# Patient Record
Sex: MALE | Race: WHITE | NOT HISPANIC OR LATINO | Employment: OTHER | URBAN - METROPOLITAN AREA
[De-identification: names, ages, dates, MRNs, and addresses within clinical notes are randomized per-mention and may not be internally consistent; named-entity substitution may affect disease eponyms.]

---

## 2023-08-28 PROBLEM — D23.10 PAPILLOMA OF EYELID: Status: ACTIVE | Noted: 2023-08-28

## 2023-08-28 PROBLEM — H61.23 BILATERAL IMPACTED CERUMEN: Status: ACTIVE | Noted: 2020-03-12

## 2023-08-28 PROBLEM — K21.9 GERD (GASTROESOPHAGEAL REFLUX DISEASE): Status: ACTIVE | Noted: 2023-08-28

## 2023-08-28 PROBLEM — R42 VERTIGO: Status: ACTIVE | Noted: 2023-08-28

## 2023-08-28 PROBLEM — H69.93 DYSFUNCTION OF BOTH EUSTACHIAN TUBES: Status: ACTIVE | Noted: 2020-04-14

## 2023-08-28 PROBLEM — M79.9 DISORDER OF MUSCULOSKELETAL SYSTEM: Status: ACTIVE | Noted: 2023-08-28

## 2023-08-28 PROBLEM — H11.159 PINGUECULA: Status: ACTIVE | Noted: 2023-08-28

## 2023-08-28 PROBLEM — L98.499: Status: ACTIVE | Noted: 2023-08-28

## 2023-08-28 PROBLEM — H26.9 CATARACTS, BOTH EYES: Status: ACTIVE | Noted: 2023-08-28

## 2023-08-28 PROBLEM — R10.13 EPIGASTRIC PAIN: Status: ACTIVE | Noted: 2023-08-28

## 2023-08-28 PROBLEM — E55.9 VITAMIN D DEFICIENCY: Status: ACTIVE | Noted: 2023-08-28

## 2023-08-28 PROBLEM — R00.1 SINUS BRADYCARDIA: Status: ACTIVE | Noted: 2023-08-28

## 2023-08-28 PROBLEM — R73.9 HYPERGLYCEMIA: Status: ACTIVE | Noted: 2023-08-28

## 2023-08-28 PROBLEM — R26.9 ABNORMAL GAIT: Status: ACTIVE | Noted: 2023-08-28

## 2023-08-28 PROBLEM — I49.9 IRREGULAR HEART RHYTHM: Status: ACTIVE | Noted: 2023-08-28

## 2023-08-28 PROBLEM — H02.9 LESION OF LEFT UPPER EYELID: Status: ACTIVE | Noted: 2023-08-28

## 2023-08-28 PROBLEM — J45.991 COUGH VARIANT ASTHMA (HHS-HCC): Status: ACTIVE | Noted: 2023-08-28

## 2023-08-28 PROBLEM — M48.061 LUMBAR SPINAL STENOSIS: Status: ACTIVE | Noted: 2023-08-28

## 2023-08-28 PROBLEM — M54.12 CERVICAL RADICULOPATHY: Status: ACTIVE | Noted: 2023-08-28

## 2023-08-28 PROBLEM — I10 HYPERTENSION: Status: ACTIVE | Noted: 2023-08-28

## 2023-08-28 PROBLEM — K56.609 INTESTINAL OBSTRUCTION (MULTI): Status: ACTIVE | Noted: 2023-08-28

## 2023-08-28 PROBLEM — N52.31 ERECTILE DYSFUNCTION AFTER RADICAL PROSTATECTOMY: Status: ACTIVE | Noted: 2023-08-28

## 2023-08-28 PROBLEM — H81.10 BENIGN PAROXYSMAL POSITIONAL VERTIGO: Status: ACTIVE | Noted: 2020-03-12

## 2023-08-28 PROBLEM — H18.419 ARCUS SENILIS: Status: ACTIVE | Noted: 2023-08-28

## 2023-08-28 PROBLEM — J31.0 CHRONIC RHINITIS: Status: ACTIVE | Noted: 2023-08-28

## 2023-08-28 PROBLEM — R79.89 LOW VITAMIN D LEVEL: Status: ACTIVE | Noted: 2023-08-28

## 2023-08-28 PROBLEM — C61 PROSTATE CANCER (MULTI): Status: ACTIVE | Noted: 2023-08-28

## 2023-08-28 PROBLEM — E66.01 MORBID OBESITY (MULTI): Status: ACTIVE | Noted: 2023-08-28

## 2023-08-28 PROBLEM — I49.3 PVC (PREMATURE VENTRICULAR CONTRACTION): Status: ACTIVE | Noted: 2023-08-28

## 2023-08-28 RX ORDER — OMEPRAZOLE 40 MG/1
40 CAPSULE, DELAYED RELEASE ORAL DAILY PRN
COMMUNITY

## 2023-08-28 RX ORDER — PREDNISONE 5 MG/1
5 TABLET ORAL 2 TIMES DAILY
COMMUNITY

## 2023-08-28 RX ORDER — IPRATROPIUM BROMIDE 42 UG/1
2 SPRAY, METERED NASAL 3 TIMES DAILY
COMMUNITY
Start: 2021-04-02 | End: 2023-11-01 | Stop reason: WASHOUT

## 2023-08-28 RX ORDER — TRAMADOL HYDROCHLORIDE 50 MG/1
50 TABLET ORAL DAILY PRN
COMMUNITY

## 2023-08-28 RX ORDER — POLYETHYLENE GLYCOL 3350 17 G/17G
17 POWDER, FOR SOLUTION ORAL DAILY
COMMUNITY

## 2023-08-28 RX ORDER — FLUTICASONE PROPIONATE 50 MCG
2 SPRAY, SUSPENSION (ML) NASAL AS NEEDED
COMMUNITY

## 2023-08-28 RX ORDER — LOSARTAN POTASSIUM 25 MG/1
25 TABLET ORAL DAILY
COMMUNITY
Start: 2021-04-07 | End: 2023-11-01 | Stop reason: WASHOUT

## 2023-08-28 RX ORDER — LISINOPRIL 30 MG/1
30 TABLET ORAL
COMMUNITY
End: 2023-11-01 | Stop reason: WASHOUT

## 2023-08-28 RX ORDER — DOCUSATE SODIUM 100 MG/1
1 CAPSULE, LIQUID FILLED ORAL DAILY PRN
COMMUNITY

## 2023-10-31 PROBLEM — M21.372 LEFT FOOT DROP: Status: ACTIVE | Noted: 2023-10-31

## 2023-11-01 ENCOUNTER — OFFICE VISIT (OUTPATIENT)
Dept: PRIMARY CARE | Facility: CLINIC | Age: 83
End: 2023-11-01
Payer: MEDICARE

## 2023-11-01 VITALS
WEIGHT: 186 LBS | DIASTOLIC BLOOD PRESSURE: 86 MMHG | HEART RATE: 68 BPM | HEIGHT: 68 IN | TEMPERATURE: 96.5 F | BODY MASS INDEX: 28.19 KG/M2 | OXYGEN SATURATION: 97 % | SYSTOLIC BLOOD PRESSURE: 138 MMHG

## 2023-11-01 DIAGNOSIS — C61 PROSTATE CANCER (MULTI): ICD-10-CM

## 2023-11-01 DIAGNOSIS — M79.9 DISORDER OF MUSCULOSKELETAL SYSTEM: ICD-10-CM

## 2023-11-01 DIAGNOSIS — I10 PRIMARY HYPERTENSION: Primary | ICD-10-CM

## 2023-11-01 DIAGNOSIS — M21.372 LEFT FOOT DROP: ICD-10-CM

## 2023-11-01 DIAGNOSIS — M48.062 SPINAL STENOSIS OF LUMBAR REGION WITH NEUROGENIC CLAUDICATION: ICD-10-CM

## 2023-11-01 PROCEDURE — 99214 OFFICE O/P EST MOD 30 MIN: CPT | Performed by: INTERNAL MEDICINE

## 2023-11-01 PROCEDURE — 1125F AMNT PAIN NOTED PAIN PRSNT: CPT | Performed by: INTERNAL MEDICINE

## 2023-11-01 PROCEDURE — 1036F TOBACCO NON-USER: CPT | Performed by: INTERNAL MEDICINE

## 2023-11-01 PROCEDURE — 1159F MED LIST DOCD IN RCRD: CPT | Performed by: INTERNAL MEDICINE

## 2023-11-01 PROCEDURE — 3075F SYST BP GE 130 - 139MM HG: CPT | Performed by: INTERNAL MEDICINE

## 2023-11-01 PROCEDURE — 3079F DIAST BP 80-89 MM HG: CPT | Performed by: INTERNAL MEDICINE

## 2023-11-01 PROCEDURE — 99214 OFFICE O/P EST MOD 30 MIN: CPT | Mod: 25,27 | Performed by: INTERNAL MEDICINE

## 2023-11-01 RX ORDER — LOSARTAN POTASSIUM 25 MG/1
25 TABLET ORAL DAILY
COMMUNITY
End: 2023-11-27

## 2023-11-01 ASSESSMENT — PATIENT HEALTH QUESTIONNAIRE - PHQ9
2. FEELING DOWN, DEPRESSED OR HOPELESS: NOT AT ALL
SUM OF ALL RESPONSES TO PHQ9 QUESTIONS 1 AND 2: 0
1. LITTLE INTEREST OR PLEASURE IN DOING THINGS: NOT AT ALL

## 2023-11-01 ASSESSMENT — ENCOUNTER SYMPTOMS
OCCASIONAL FEELINGS OF UNSTEADINESS: 0
LOSS OF SENSATION IN FEET: 0
DEPRESSION: 0

## 2023-11-01 ASSESSMENT — PAIN SCALES - GENERAL: PAINLEVEL: 4

## 2023-11-01 NOTE — PROGRESS NOTES
[facility]: [facility]  PROGRESS NOTE      Geovanni Carroll is a 83 y.o. male that is being seen  today for Follow-up.  Subjective   Patient is 83-year-old male with a past medical history of hypertension, history of prostate cancer on chemo who is being seen for 6-month follow-up.  Patient has been seen by oncologist and was on IV chemotherapy and his last PSA level is 3.06.  Patient kidney functions and liver enzymes have been stable.  Has mild anemia but it  has been chronic.  Patient denies any issues with the medication.  Patient had some issues with the foot drop in his left leg for which she is using the brace now.      ROS  Negative for fever or chills  Negative for sore throat, ear pain, nasal discharge  Negative for cough, shortness of breath or wheezing  Negative for chest pain, palpitations, swelling of legs  Negative for abdominal pain, constipation, diarrhea, blood in the stools  Negative for urinary complaints  Negative for headache, dizziness, weakness or numbness  Positive for back pain and difficulty in walking  Negative for depression or anxiety  All other systems reviewed and were negative   Vitals:    11/01/23 1127   BP: 138/86   Pulse: 68   Temp: 35.8 °C (96.5 °F)   SpO2: 97%      Vitals:    11/01/23 1127   Weight: 84.4 kg (186 lb)     Body mass index is 28.28 kg/m².  Physical Exam  Constitutional: Patient does not appear to be in any acute distress  Head and Face: NCAT  Eyes: Normal external exam, EOMI  ENT: Normal external inspection of ears and nose. Oropharynx normal.  Cardiovascular: RRR, S1/S2, no murmurs, rubs, or gallops, radial pulses +2, no edema of extremities  Pulmonary: CTAB, no respiratory distress.  Abdomen: +BS, soft, non-tender, nondistended, no guarding or rebound, no masses noted  MSK: No joint swelling, range of motion of both legs are normal.  Patient has been following foot drop on the left leg  Skin- No lesions, contusions, or erythema.  Peripheral  puslses palpable bilaterally 2+  Neuro: AAO X3, Cranial nerves 2-12 grossly intact,DTR 2+ in all 4 limbs   Psychiatric: Judgment intact. Appropriate mood and behavior    Diagnostic Results   Lab Results   Component Value Date    GLUCOSE 101 (H) 05/07/2022    CALCIUM 9.2 05/07/2022     05/07/2022    K 4.1 05/07/2022    CO2 27 05/07/2022     05/07/2022    BUN 18 05/07/2022    CREATININE 0.9 05/07/2022     Lab Results   Component Value Date    ALT 16 05/07/2022    AST 22 05/07/2022    ALKPHOS 48 05/07/2022    BILITOT 0.5 05/07/2022     Lab Results   Component Value Date    WBC 5.1 05/07/2022    HGB 13.7 05/07/2022    HCT 39.7 (L) 05/07/2022    MCV 98.5 05/07/2022     05/07/2022     Lab Results   Component Value Date    CHOL 184 05/28/2021    CHOL 189 04/12/2018     Lab Results   Component Value Date    HDL 85 05/28/2021    HDL 63.7 04/12/2018     Lab Results   Component Value Date    LDLCALC 77 05/28/2021     Lab Results   Component Value Date    TRIG 112 05/28/2021    TRIG 95 04/12/2018     Lab Results   Component Value Date    HGBA1C 5.6 05/28/2021     Other labs not included in the list above were reviewed either before or during this encounter.    History    Past Medical History:   Diagnosis Date    Acute upper respiratory infection, unspecified 10/08/2017    Acute upper respiratory infection    Personal history of other diseases of the circulatory system     History of hypertension    Personal history of other diseases of the musculoskeletal system and connective tissue 03/01/2019    History of acquired spondylolisthesis    Personal history of other diseases of the respiratory system 10/08/2017    History of acute bronchitis    Radiculopathy, lumbar region 04/26/2019    Acute lumbar radiculopathy     Past Surgical History:   Procedure Laterality Date    OTHER SURGICAL HISTORY  12/16/2020    Cataract surgery    PROSTATE SURGERY  07/24/2015    Prostate Surgery     No family history on  file.  Allergies   Allergen Reactions    Hydrochlorothiazide Other     hyponatremia    Prednisone Rash     Current Outpatient Medications on File Prior to Visit   Medication Sig Dispense Refill    albuterol 108 (90 Base) MCG/ACT inhaler Inhale 2 puffs every 4 hours if needed for wheezing or shortness of breath.      [id number]-zinc--tia-bor 600 mg calcium- 800 unit-50 mg tablet Take 1 tablet by mouth 2 times a day with meals.      docusate sodium (Colace) 100 mg capsule Take 1 capsule (100 mg) by mouth once daily as needed for constipation.      fluticasone (Flonase) 50 mcg/actuation nasal spray Administer 2 sprays into each nostril if needed for allergies.      losartan (Cozaar) 25 mg tablet Take 1 tablet (25 mg) by mouth once daily.      omeprazole (PriLOSEC) 40 mg DR capsule Take 1 capsule (40 mg) by mouth.      polyethylene glycol (Miralax) 17 gram/dose powder Take 17 g by mouth once daily. mixed with 8 ounces of fluid AS NEEDED      predniSONE (Deltasone) 5 mg tablet Take 1 tablet (5 mg) by mouth.      traMADol (Ultram) 50 mg tablet Take 1 tablet (50 mg) by mouth once daily as needed for severe pain (7 - 10) or moderate pain (4 - 6).      [DISCONTINUED] ipratropium (Atrovent) 42 mcg (0.06 %) nasal spray Administer 2 sprays into each nostril 3 times a day.      [DISCONTINUED] lisinopril 30 mg tablet Take 1 tablet (30 mg) by mouth.      [DISCONTINUED] losartan (Cozaar) 25 mg tablet Take 1 tablet (25 mg) by mouth once daily.       No current facility-administered medications on file prior to visit.     Immunization History   Administered Date(s) Administered    Flu vaccine, quadrivalent, high-dose, preservative free, age 65y+ (FLUZONE) 10/16/2020, 10/04/2021, 09/26/2022, 10/10/2023    Influenza, High Dose Seasonal, Preservative Free 11/04/2015, 10/24/2016, 10/21/2017, 10/16/2018, 10/21/2019    Influenza, Unspecified 10/16/2011, 10/08/2012, 10/17/2013    Influenza, injectable, quadrivalent 11/04/2015     Influenza, seasonal, injectable 10/17/2014    Moderna COVID-19 vaccine, Fall 2023, 12 yeasrs and older (50mcg/0.5mL) 10/20/2023    Moderna COVID-19 vaccine, bivalent, blue cap/gray label *Check age/dose* 10/17/2022    Moderna SARS-CoV-2 Vaccination 01/22/2021, 02/19/2021, 08/16/2021, 04/01/2022    Pneumococcal conjugate vaccine, 13-valent (PREVNAR 13) 04/23/2018    Pneumococcal polysaccharide vaccine, 23-valent, age 2 years and older (PNEUMOVAX 23) 03/24/2006, 07/01/2006, 01/01/2010, 07/13/2020    RSV, 60 Years And Older (AREXVY) 09/25/2023    Td vaccine, age 7 years and older (TDVAX) 01/01/1998    Tdap vaccine, age 7 year and older (BOOSTRIX) 04/29/2009    Zoster vaccine, recombinant, adult (SHINGRIX) 07/08/2020, 09/17/2020    Zoster, live 01/01/2009     Patient's medical history was reviewed and updated either before or during this encounter.  ASSESSMENT / PLAN:  Diagnoses and all orders for this visit:  Primary hypertension  Prostate cancer (CMS/HCC)  Disorder of musculoskeletal system  Spinal stenosis of lumbar region with neurogenic claudication  Left foot drop    Patient's blood pressure has been fairly controlled with losartan 25 mg.  Patient follows up with oncologist for prostate cancer and has been getting chemotherapy.  Patient does have spinal stenosis and some pain in his left leg for which she is seeing orthopedic physician as well as had physical therapy.  Patient is using brace for left foot drop.      Tucker Flores MD

## 2023-12-13 ENCOUNTER — APPOINTMENT (OUTPATIENT)
Dept: OPHTHALMOLOGY | Facility: CLINIC | Age: 83
End: 2023-12-13
Payer: MEDICARE

## 2023-12-29 ENCOUNTER — TELEPHONE (OUTPATIENT)
Dept: PRIMARY CARE | Facility: CLINIC | Age: 83
End: 2023-12-29
Payer: MEDICARE

## 2023-12-29 ENCOUNTER — HOSPITAL ENCOUNTER (EMERGENCY)
Facility: HOSPITAL | Age: 83
Discharge: HOME | End: 2023-12-29
Payer: MEDICARE

## 2023-12-29 ENCOUNTER — APPOINTMENT (OUTPATIENT)
Dept: CARDIOLOGY | Facility: HOSPITAL | Age: 83
End: 2023-12-29
Payer: MEDICARE

## 2023-12-29 ENCOUNTER — APPOINTMENT (OUTPATIENT)
Dept: RADIOLOGY | Facility: HOSPITAL | Age: 83
End: 2023-12-29
Payer: MEDICARE

## 2023-12-29 VITALS
BODY MASS INDEX: 28.47 KG/M2 | HEART RATE: 76 BPM | RESPIRATION RATE: 18 BRPM | DIASTOLIC BLOOD PRESSURE: 88 MMHG | TEMPERATURE: 98.6 F | HEIGHT: 68 IN | SYSTOLIC BLOOD PRESSURE: 142 MMHG | OXYGEN SATURATION: 97 % | WEIGHT: 187.83 LBS

## 2023-12-29 DIAGNOSIS — D69.6 THROMBOCYTOPENIA (CMS-HCC): ICD-10-CM

## 2023-12-29 DIAGNOSIS — D64.9 ANEMIA, UNSPECIFIED TYPE: ICD-10-CM

## 2023-12-29 DIAGNOSIS — M89.9 BONE LESION: ICD-10-CM

## 2023-12-29 DIAGNOSIS — R31.0 GROSS HEMATURIA: ICD-10-CM

## 2023-12-29 DIAGNOSIS — R31.9 URINARY TRACT INFECTION WITH HEMATURIA, SITE UNSPECIFIED: Primary | ICD-10-CM

## 2023-12-29 DIAGNOSIS — Z85.830 HISTORY OF BONE CANCER: ICD-10-CM

## 2023-12-29 DIAGNOSIS — N39.0 URINARY TRACT INFECTION WITH HEMATURIA, SITE UNSPECIFIED: Primary | ICD-10-CM

## 2023-12-29 DIAGNOSIS — E87.1 HYPONATREMIA: ICD-10-CM

## 2023-12-29 LAB
ALBUMIN SERPL-MCNC: 4.1 G/DL (ref 3.5–5)
ALP BLD-CCNC: 37 U/L (ref 35–125)
ALT SERPL-CCNC: 15 U/L (ref 5–40)
ANION GAP SERPL CALC-SCNC: 11 MMOL/L
APPEARANCE UR: ABNORMAL
AST SERPL-CCNC: 21 U/L (ref 5–40)
BASOPHILS # BLD AUTO: 0.01 X10*3/UL (ref 0–0.1)
BASOPHILS NFR BLD AUTO: 0.2 %
BILIRUB SERPL-MCNC: 0.4 MG/DL (ref 0.1–1.2)
BILIRUB UR STRIP.AUTO-MCNC: NEGATIVE MG/DL
BUN SERPL-MCNC: 13 MG/DL (ref 8–25)
CALCIUM SERPL-MCNC: 9 MG/DL (ref 8.5–10.4)
CHLORIDE SERPL-SCNC: 97 MMOL/L (ref 97–107)
CO2 SERPL-SCNC: 22 MMOL/L (ref 24–31)
COLOR UR: ABNORMAL
CREAT SERPL-MCNC: 0.8 MG/DL (ref 0.4–1.6)
EOSINOPHIL # BLD AUTO: 0.16 X10*3/UL (ref 0–0.4)
EOSINOPHIL NFR BLD AUTO: 3.6 %
ERYTHROCYTE [DISTWIDTH] IN BLOOD BY AUTOMATED COUNT: 12.7 % (ref 11.5–14.5)
GFR SERPL CREATININE-BSD FRML MDRD: 87 ML/MIN/1.73M*2
GLUCOSE SERPL-MCNC: 91 MG/DL (ref 65–99)
GLUCOSE UR STRIP.AUTO-MCNC: NORMAL MG/DL
HCT VFR BLD AUTO: 32.2 % (ref 41–52)
HGB BLD-MCNC: 11.7 G/DL (ref 13.5–17.5)
IMM GRANULOCYTES # BLD AUTO: 0.01 X10*3/UL (ref 0–0.5)
IMM GRANULOCYTES NFR BLD AUTO: 0.2 % (ref 0–0.9)
KETONES UR STRIP.AUTO-MCNC: NEGATIVE MG/DL
LEUKOCYTE ESTERASE UR QL STRIP.AUTO: ABNORMAL
LYMPHOCYTES # BLD AUTO: 0.85 X10*3/UL (ref 0.8–3)
LYMPHOCYTES NFR BLD AUTO: 19.1 %
MCH RBC QN AUTO: 35 PG (ref 26–34)
MCHC RBC AUTO-ENTMCNC: 36.3 G/DL (ref 32–36)
MCV RBC AUTO: 96 FL (ref 80–100)
MONOCYTES # BLD AUTO: 0.39 X10*3/UL (ref 0.05–0.8)
MONOCYTES NFR BLD AUTO: 8.8 %
NEUTROPHILS # BLD AUTO: 3.02 X10*3/UL (ref 1.6–5.5)
NEUTROPHILS NFR BLD AUTO: 68.1 %
NITRITE UR QL STRIP.AUTO: NEGATIVE
NRBC BLD-RTO: 0 /100 WBCS (ref 0–0)
PH UR STRIP.AUTO: 6.5 [PH]
PLATELET # BLD AUTO: 141 X10*3/UL (ref 150–450)
POTASSIUM SERPL-SCNC: 4.3 MMOL/L (ref 3.4–5.1)
PROT SERPL-MCNC: 6.5 G/DL (ref 5.9–7.9)
PROT UR STRIP.AUTO-MCNC: ABNORMAL MG/DL
RBC # BLD AUTO: 3.34 X10*6/UL (ref 4.5–5.9)
RBC # UR STRIP.AUTO: ABNORMAL /UL
RBC #/AREA URNS AUTO: >20 /HPF
SODIUM SERPL-SCNC: 130 MMOL/L (ref 133–145)
SP GR UR STRIP.AUTO: 1.01
UROBILINOGEN UR STRIP.AUTO-MCNC: NORMAL MG/DL
WBC # BLD AUTO: 4.4 X10*3/UL (ref 4.4–11.3)
WBC #/AREA URNS AUTO: ABNORMAL /HPF

## 2023-12-29 PROCEDURE — 80053 COMPREHEN METABOLIC PANEL: CPT | Performed by: EMERGENCY MEDICINE

## 2023-12-29 PROCEDURE — 93005 ELECTROCARDIOGRAM TRACING: CPT

## 2023-12-29 PROCEDURE — 87086 URINE CULTURE/COLONY COUNT: CPT | Mod: TRILAB | Performed by: CLINICAL NURSE SPECIALIST

## 2023-12-29 PROCEDURE — 74176 CT ABD & PELVIS W/O CONTRAST: CPT

## 2023-12-29 PROCEDURE — 81003 URINALYSIS AUTO W/O SCOPE: CPT | Performed by: CLINICAL NURSE SPECIALIST

## 2023-12-29 PROCEDURE — 85025 COMPLETE CBC W/AUTO DIFF WBC: CPT | Performed by: EMERGENCY MEDICINE

## 2023-12-29 PROCEDURE — 96365 THER/PROPH/DIAG IV INF INIT: CPT

## 2023-12-29 PROCEDURE — 36415 COLL VENOUS BLD VENIPUNCTURE: CPT | Performed by: EMERGENCY MEDICINE

## 2023-12-29 PROCEDURE — 99284 EMERGENCY DEPT VISIT MOD MDM: CPT

## 2023-12-29 PROCEDURE — 2500000004 HC RX 250 GENERAL PHARMACY W/ HCPCS (ALT 636 FOR OP/ED): Performed by: CLINICAL NURSE SPECIALIST

## 2023-12-29 PROCEDURE — 99285 EMERGENCY DEPT VISIT HI MDM: CPT | Mod: 25

## 2023-12-29 RX ORDER — CEFTRIAXONE 1 G/50ML
1 INJECTION, SOLUTION INTRAVENOUS ONCE
Status: COMPLETED | OUTPATIENT
Start: 2023-12-29 | End: 2023-12-29

## 2023-12-29 RX ORDER — CEPHALEXIN 500 MG/1
500 CAPSULE ORAL 4 TIMES DAILY
Qty: 40 CAPSULE | Refills: 0 | Status: SHIPPED | OUTPATIENT
Start: 2023-12-29 | End: 2024-01-08

## 2023-12-29 RX ADMIN — CEFTRIAXONE SODIUM 1 G: 1 INJECTION, SOLUTION INTRAVENOUS at 13:52

## 2023-12-29 ASSESSMENT — PAIN SCALES - GENERAL
PAINLEVEL_OUTOF10: 0 - NO PAIN
PAINLEVEL_OUTOF10: 0 - NO PAIN

## 2023-12-29 ASSESSMENT — COLUMBIA-SUICIDE SEVERITY RATING SCALE - C-SSRS
2. HAVE YOU ACTUALLY HAD ANY THOUGHTS OF KILLING YOURSELF?: NO
1. IN THE PAST MONTH, HAVE YOU WISHED YOU WERE DEAD OR WISHED YOU COULD GO TO SLEEP AND NOT WAKE UP?: NO
6. HAVE YOU EVER DONE ANYTHING, STARTED TO DO ANYTHING, OR PREPARED TO DO ANYTHING TO END YOUR LIFE?: NO

## 2023-12-29 ASSESSMENT — PAIN - FUNCTIONAL ASSESSMENT
PAIN_FUNCTIONAL_ASSESSMENT: 0-10
PAIN_FUNCTIONAL_ASSESSMENT: 0-10

## 2023-12-29 NOTE — ED PROVIDER NOTES
Department of Emergency Medicine   ED  Provider Note  Admit Date/RoomTime: 12/29/2023  9:38 AM  ED Room: 06/06        History of Present Illness:  Chief Complaint   Patient presents with    Blood in Urine     Pt states this morning he started having bloody urine.  States it is a light pink and he passed 2 clots.  States he also has chronic lower back pain from sciatica that he thinks is related to the bloody urine.  Has been treated for prostate cancer.  States he got lightheaded when he saw the bloody urine.          Geovanni Carroll is a 83 y.o. male history of prostate cancer, bone mets has been on chemo and radiation.  No recent chemotherapy or radiation history of back pain and sciatica, vertigo, hypertension, vertigo presenting to the ED for blood in the urine, beginning today.  Patient reports today he got up to go to the bathroom and noticing blood in his urine followed by clot then the rest of history was normal.  He denies any pressure burning or frequency with urination.  No pain with urination.  He has chronic back pain with sciatica denies any change.  No loss of bowel or bladder control no numbness or tingling to the groin.  Denies fever or chills.  No history of IV drug use or fall.  No fever.  Reports he drinks wine daily at night 1 glass.  Denies any scrotal edema no testicle pain no rashes lesions or sores.  No injury to the penis.  No drainage.  Review of Systems:   Pertinent positives and negatives are stated within HPI, all other systems reviewed and are negative.        --------------------------------------------- PAST HISTORY ---------------------------------------------  Past Medical History:  has a past medical history of Acute upper respiratory infection, unspecified (10/08/2017), Personal history of other diseases of the circulatory system, Personal history of other diseases of the musculoskeletal system and connective tissue (03/01/2019), Personal history of other diseases of the  respiratory system (10/08/2017), and Radiculopathy, lumbar region (04/26/2019).  Past Surgical History:  has a past surgical history that includes Prostate surgery (07/24/2015) and Other surgical history (12/16/2020).  Social History:  reports that he quit smoking about 48 years ago. His smoking use included cigarettes. He has never used smokeless tobacco. He reports current alcohol use of about 7.0 standard drinks of alcohol per week. He reports that he does not use drugs.  Family History: family history is not on file.. Unless otherwise noted, family history is non contributory  The patient’s home medications have been reviewed.  Allergies: Patient has no known allergies.        ---------------------------------------------------PHYSICAL EXAM--------------------------------------    GENERAL APPEARANCE: Awake and alert.   VITAL SIGNS: As per the nurses' triage record.   HEENT: Normocephalic, atraumatic. Extraocular muscles are intact.Conjunctiva are pink. Negative scleral icterus. Mucous membranes are moist. Tongue in the midline. Pharynx was without erythema or exudates, uvula midline  NECK: Soft Nontender and supple, full gross ROM, no meningeal signs.  CHEST: Nontender to palpation. Clear to auscultation bilaterally. No rales, rhonchi, or wheezing.   HEART: S1, S2. Regular rate and rhythm. No murmurs, gallops or rubs.  Strong and equal pulses in the extremities.   ABDOMEN: Soft, nontender, nondistended, positive bowel sounds, no palpable masses.  MUSCULCSKELETAL: The calves are nontender to palpation. Full gross active range of motion. Ambulating on own with no acute difficulties  NEUROLOGICAL: Awake, alert and oriented x 3. Power intact in the upper and lower extremities. Sensation is intact to light touch in the upper and lower extremities.   IMMUNOLOGICAL: No lymphatic streaking noted   DERM: No petechiae, rashes, or ecchymoses.          ------------------------- NURSING NOTES AND VITALS REVIEWED  "---------------------------  The nursing notes within the ED encounter and vital signs as below have been reviewed by myself  /88   Pulse 76   Temp 37 °C (98.6 °F) (Oral)   Resp 18   Ht 1.727 m (5' 8\")   Wt 85.2 kg (187 lb 13.3 oz)   SpO2 97%   BMI 28.56 kg/m²     Oxygen Saturation Interpretation: Normal      The patient’s available past medical records and past encounters were reviewed.          -----------------------DIAGNOSTIC RESULTS------------------------  LABS:    Labs Reviewed   CBC WITH AUTO DIFFERENTIAL - Abnormal       Result Value    WBC 4.4      nRBC 0.0      RBC 3.34 (*)     Hemoglobin 11.7 (*)     Hematocrit 32.2 (*)     MCV 96      MCH 35.0 (*)     MCHC 36.3 (*)     RDW 12.7      Platelets 141 (*)     Neutrophils % 68.1      Immature Granulocytes %, Automated 0.2      Lymphocytes % 19.1      Monocytes % 8.8      Eosinophils % 3.6      Basophils % 0.2      Neutrophils Absolute 3.02      Immature Granulocytes Absolute, Automated 0.01      Lymphocytes Absolute 0.85      Monocytes Absolute 0.39      Eosinophils Absolute 0.16      Basophils Absolute 0.01     COMPREHENSIVE METABOLIC PANEL - Abnormal    Glucose 91      Sodium 130 (*)     Potassium 4.3      Chloride 97      Bicarbonate 22 (*)     Urea Nitrogen 13      Creatinine 0.80      eGFR 87      Calcium 9.0      Albumin 4.1      Alkaline Phosphatase 37      Total Protein 6.5      AST 21      Bilirubin, Total 0.4      ALT 15      Anion Gap 11     URINALYSIS WITH REFLEX MICROSCOPIC - Abnormal    Color, Urine Dark-Brown (*)     Appearance, Urine Ex.Turbid (*)     Specific Gravity, Urine 1.011      pH, Urine 6.5      Protein, Urine 70 (1+) (*)     Glucose, Urine Normal      Blood, Urine OVER (3+) (*)     Ketones, Urine NEGATIVE      Bilirubin, Urine NEGATIVE      Urobilinogen, Urine Normal      Nitrite, Urine NEGATIVE      Leukocyte Esterase, Urine 25 Sheri/µL (*)    MICROSCOPIC ONLY, URINE - Abnormal    WBC, Urine 21-50 (*)     RBC, Urine >20 " (*)    URINE CULTURE       As interpreted by me, the above displayed labs are abnormal. All other labs obtained during this visit were within normal range or not returned as of this dictation.        CT abdomen pelvis wo IV contrast   Final Result   1. Cyst lateral segment left lobe of the liver.   2. Cholelithiasis.   3. New sclerotic lesions right iliac wing consistent with progressive   metastatic disease compared to the previous study from 05/07/2022.   4. No acute pathologic findings are identified.        MACRO:   none        Signed by: Garry Chamberlain 12/29/2023 11:38 AM   Dictation workstation:   [id number]              CT abdomen pelvis wo IV contrast   Final Result   1. Cyst lateral segment left lobe of the liver.   2. Cholelithiasis.   3. New sclerotic lesions right iliac wing consistent with progressive   metastatic disease compared to the previous study from 05/07/2022.   4. No acute pathologic findings are identified.        MACRO:   none        Signed by: Garry Chamberlain 12/29/2023 11:38 AM   Dictation workstation:   [id number]              ------------------------------ ED COURSE/MEDICAL DECISION MAKING----------------------  Medical Decision Making:   Exam: A medically appropriate exam performed, outlined above, given the known history and presentation.    History obtained from: Review of medical record nursing notes patient      Social Determinants of Health considered during this visit: Lives at home with family      PAST MEDICAL HISTORY/Chronic Conditions Affecting Care     has a past medical history of Acute upper respiratory infection, unspecified (10/08/2017), Personal history of other diseases of the circulatory system, Personal history of other diseases of the musculoskeletal system and connective tissue (03/01/2019), Personal history of other diseases of the respiratory system (10/08/2017), and Radiculopathy, lumbar region (04/26/2019).       CC/HPI Summary, Social Determinants of health,  Records Reviewed, DDx, testing done/not done, ED Course, Reassessment, disposition considerations/shared decision making with patient, consults, disposition:   Presents to the emergency department with blood in urine  CT abdomen pelvis-1. Cyst lateral segment left lobe of the liver.  2. Cholelithiasis.  3. New sclerotic lesions right iliac wing consistent with progressive  metastatic disease compared to the previous study from 05/07/2022.  4. No acute pathologic findings are identified.  EKG per attending note  CBC  CMP  Urine  Urine culture    Medical Decision Making/Differential Diagnosis:  UTI versus kidney stone versus bladder mass versus cystitis hemorrhagic  Review:  Urine was dark brown turbid blood clots noted plus protein large blood leukocytes 25 WBCs 21-50 culture pending  White blood cell count 4.4  Hemoglobin 11.7  Platelets 141  Glucose 91  Sodium 130  Bicarb 22 otherwise electrolytes within normal limits  Creatinine 0.8  BUN 13  LFTs within normal limits  Patient presented with blood in the urine.  CT of the abdomen pelvis showed cyst lateral segment left lobe of the liver.  Cholelithiasis.  New sclerotic lesion right iliac wing consistent with progressive metastatic disease compared to previous study last year.  No acute pathological findings.  This was discussed with the patient advised that he knows he has bone mets did discuss the new lesion.  He follows with his oncologist as soon as possible.  Urine suspicious for UTI.  Received Rocephin and placed on Keflex.  Culture pending no elevation white blood cell count.  Anemia noted.  In the presence of blood in the urine.  However he is not actively bleeding at this time.  Close follow-up with his primary care.  Electrolyte imbalance noted.  Sodium slightly low at 130 patient is tolerating fluids.  Based on patient's clinical presentation history and symptoms consistent with hematuria in the presence of concern for UTI  Lesion noted to the iliac wing  history of bone mets  Sodium slightly low however patient is eating and drinking.  Patient seen and evaluated with attending physician     Thrombocytopenia      PROCEDURES  Unless otherwise noted below, none      CONSULTS:   None      Diagnoses as of 12/29/23 1449   Urinary tract infection with hematuria, site unspecified   Gross hematuria   History of bone cancer   Bone lesion   Anemia, unspecified type   Thrombocytopenia (CMS/HCC)   Hyponatremia         This patient has remained hemodynamically stable during their ED course.      Critical Care: none       Counseling:  The emergency provider has spoken with the patient and family and discussed today’s results, in addition to providing specific details for the plan of care and counseling regarding the diagnosis and prognosis.  Questions are answered at this time and they are agreeable with the plan.         --------------------------------- IMPRESSION AND DISPOSITION ---------------------------------    IMPRESSION  1. Urinary tract infection with hematuria, site unspecified    2. Gross hematuria    3. History of bone cancer    4. Bone lesion    5. Anemia, unspecified type    6. Thrombocytopenia (CMS/HCC)    7. Hyponatremia        DISPOSITION  Disposition: Discharge home with family  Patient condition is stable        NOTE: This report was transcribed using voice recognition software. Every effort was made to ensure accuracy; however, inadvertent computerized transcription errors may be present      Prabha Healy, YON-CNP  12/29/23 [id number]

## 2023-12-29 NOTE — DISCHARGE INSTRUCTIONS
Increase fluids  Cranberry juice to diet  Take antibiotic until completed first dose given in the emergency department  Follow-up with your oncologist within the next week new lesion was noted on the CAT scan.  Follow-up with urology within 1 week due to hematuria with blood clots  Return to the emergency department any worsening symptoms or concerns or inability to urinate  Take antibiotic until completed antibiotic was sent to pharmacy

## 2023-12-29 NOTE — TELEPHONE ENCOUNTER
"Pt c/o \"a lot of blood\" in urine \"with chunks of blood coming out\" every time he urinates. Referred pt to ER.  "

## 2023-12-30 LAB — BACTERIA UR CULT: NORMAL

## 2024-01-04 LAB
ATRIAL RATE: 69 BPM
P AXIS: 78 DEGREES
P OFFSET: 179 MS
P ONSET: 147 MS
PR INTERVAL: 152 MS
Q ONSET: 223 MS
QRS COUNT: 11 BEATS
QRS DURATION: 80 MS
QT INTERVAL: 382 MS
QTC CALCULATION(BAZETT): 409 MS
QTC FREDERICIA: 400 MS
R AXIS: 11 DEGREES
T AXIS: 37 DEGREES
T OFFSET: 414 MS
VENTRICULAR RATE: 69 BPM

## 2024-01-08 ENCOUNTER — HOSPITAL ENCOUNTER (EMERGENCY)
Facility: HOSPITAL | Age: 84
Discharge: HOME | End: 2024-01-09
Attending: EMERGENCY MEDICINE
Payer: MEDICARE

## 2024-01-08 ENCOUNTER — APPOINTMENT (OUTPATIENT)
Dept: RADIOLOGY | Facility: HOSPITAL | Age: 84
End: 2024-01-08
Payer: MEDICARE

## 2024-01-08 VITALS
BODY MASS INDEX: 28.77 KG/M2 | WEIGHT: 179 LBS | TEMPERATURE: 100 F | OXYGEN SATURATION: 99 % | HEART RATE: 67 BPM | HEIGHT: 66 IN | RESPIRATION RATE: 18 BRPM

## 2024-01-08 DIAGNOSIS — S81.811A SKIN TEAR OF RIGHT LOWER LEG WITHOUT COMPLICATION, INITIAL ENCOUNTER: ICD-10-CM

## 2024-01-08 DIAGNOSIS — S09.90XA CLOSED HEAD INJURY, INITIAL ENCOUNTER: Primary | ICD-10-CM

## 2024-01-08 DIAGNOSIS — S01.81XA FACIAL LACERATION, INITIAL ENCOUNTER: ICD-10-CM

## 2024-01-08 PROCEDURE — 12013 RPR F/E/E/N/L/M 2.6-5.0 CM: CPT | Performed by: EMERGENCY MEDICINE

## 2024-01-08 PROCEDURE — 70450 CT HEAD/BRAIN W/O DYE: CPT

## 2024-01-08 PROCEDURE — 2500000001 HC RX 250 WO HCPCS SELF ADMINISTERED DRUGS (ALT 637 FOR MEDICARE OP): Performed by: EMERGENCY MEDICINE

## 2024-01-08 PROCEDURE — 99284 EMERGENCY DEPT VISIT MOD MDM: CPT | Performed by: EMERGENCY MEDICINE

## 2024-01-08 PROCEDURE — 70450 CT HEAD/BRAIN W/O DYE: CPT | Mod: FOREIGN READ | Performed by: RADIOLOGY

## 2024-01-08 RX ORDER — LIDOCAINE HYDROCHLORIDE AND EPINEPHRINE 10; 10 MG/ML; UG/ML
20 INJECTION, SOLUTION INFILTRATION; PERINEURAL ONCE
Status: COMPLETED | OUTPATIENT
Start: 2024-01-09 | End: 2024-01-09

## 2024-01-08 ASSESSMENT — COLUMBIA-SUICIDE SEVERITY RATING SCALE - C-SSRS
1. IN THE PAST MONTH, HAVE YOU WISHED YOU WERE DEAD OR WISHED YOU COULD GO TO SLEEP AND NOT WAKE UP?: NO
2. HAVE YOU ACTUALLY HAD ANY THOUGHTS OF KILLING YOURSELF?: NO
6. HAVE YOU EVER DONE ANYTHING, STARTED TO DO ANYTHING, OR PREPARED TO DO ANYTHING TO END YOUR LIFE?: NO

## 2024-01-08 ASSESSMENT — PAIN SCALES - GENERAL: PAINLEVEL_OUTOF10: 0 - NO PAIN

## 2024-01-08 ASSESSMENT — PAIN - FUNCTIONAL ASSESSMENT: PAIN_FUNCTIONAL_ASSESSMENT: 0-10

## 2024-01-09 PROBLEM — K21.9 GASTROESOPHAGEAL REFLUX DISEASE: Status: RESOLVED | Noted: 2023-08-28 | Resolved: 2024-01-09

## 2024-01-09 PROBLEM — R12 HEARTBURN: Status: RESOLVED | Noted: 2024-01-09 | Resolved: 2024-01-09

## 2024-01-09 PROBLEM — G47.00 INSOMNIA: Status: RESOLVED | Noted: 2024-01-09 | Resolved: 2024-01-09

## 2024-01-09 PROBLEM — H53.8 BLURRING OF VISUAL IMAGE: Status: RESOLVED | Noted: 2024-01-09 | Resolved: 2024-01-09

## 2024-01-09 PROCEDURE — 2500000005 HC RX 250 GENERAL PHARMACY W/O HCPCS: Performed by: EMERGENCY MEDICINE

## 2024-01-09 RX ADMIN — LIDOCAINE HYDROCHLORIDE AND EPINEPHRINE 20 ML: 10; 10 INJECTION, SOLUTION INFILTRATION; PERINEURAL at 00:00

## 2024-01-09 NOTE — ED PROVIDER NOTES
"HPI   Chief Complaint   Patient presents with    Fall     Pt fell walking out of dinner, pt has 2.5 inch skin tear right shin, multiple bruises, no loc, head lac        Patient presents to the emergency department today with complaints of having a fall that occurred while he was out.  The patient states that he did not note that there was a curb and he missed a \"step\".  The patient states that he fell forward and landed on his right leg, and also struck his head on the ground.  Patient states that he did not lose consciousness.  The patient states that he was on Eliquis in the past.  Patient states that he is not sure what he is on.  The patient's wife does confirm that the patient is no longer on Eliquis at the present time.  Patient denies any loss conscious.  Patient denies any nausea or vomiting.  Patient denies any visual disturbances.  Patient states there is no neck pain.  The patient denies fevers or chills.  The patient denies any focal weakness in the arms or legs.  Patient states that he did suffer an abrasion to the right lower extremity as well.          No data recorded                Patient History   Past Medical History:   Diagnosis Date    Acute lumbar radiculopathy     Acute upper respiratory infection, unspecified 10/08/2017    Acute upper respiratory infection    Allergic rhinitis     Blurring of visual image 01/09/2024    Constipation     Fatigue     Gastroesophageal reflux disease 08/28/2023    Heartburn 01/09/2024    HTN (hypertension)     Hyperglycemia     Insomnia 01/09/2024    Left foot drop     Personal history of other diseases of the respiratory system 10/08/2017    History of acute bronchitis    Prostate cancer (CMS/[facility])     Seasonal allergies     Spondylolisthesis     Unstable gait     Vertigo     Vitamin D deficiency      Past Surgical History:   Procedure Laterality Date    OTHER SURGICAL HISTORY  12/16/2020    Cataract surgery    PROSTATE SURGERY  07/24/2015    Prostate Surgery "     No family history on file.  Social History     Tobacco Use    Smoking status: Former     Types: Cigarettes     Quit date:      Years since quittin.0    Smokeless tobacco: Never   Vaping Use    Vaping Use: Never used   Substance Use Topics    Alcohol use: Yes     Alcohol/week: 7.0 standard drinks of alcohol     Types: 7 Glasses of wine per week    Drug use: Never       Physical Exam   ED Triage Vitals [24 2120]   Temp Heart Rate Resp BP   37.8 °C (100 °F) 67 18 --      SpO2 Temp Source Heart Rate Source Patient Position   99 % Tympanic Brachial Sitting      BP Location FiO2 (%)     Left arm --       Physical Exam  Vitals and nursing note reviewed.   Constitutional:       General: He is not in acute distress.     Appearance: He is well-developed.   HENT:      Head: Normocephalic.      Comments: 4 cm right forehead laceration and swelling, with minimal bleeding noted.  Eyes:      Conjunctiva/sclera: Conjunctivae normal.   Cardiovascular:      Rate and Rhythm: Normal rate and regular rhythm.      Heart sounds: No murmur heard.  Pulmonary:      Effort: Pulmonary effort is normal. No respiratory distress.      Breath sounds: Normal breath sounds.   Abdominal:      Palpations: Abdomen is soft.      Tenderness: There is no abdominal tenderness.   Musculoskeletal:         General: No swelling.      Cervical back: Neck supple.      Comments: Superficial abrasions noted to the right proximal anterior calf with minimal bleeding.  Bruising noted down the anterior calf itself.   Skin:     General: Skin is warm and dry.      Capillary Refill: Capillary refill takes less than 2 seconds.   Neurological:      General: No focal deficit present.      Mental Status: He is alert and oriented to person, place, and time.         ED Course & MDM   ED Course as of 24 0330    No signs of intracranial bleeding. [FR]      ED Course User Index  [FR] Pardeep Foley DO         Diagnoses as of  01/19/24 0330   Closed head injury, initial encounter   Facial laceration, initial encounter   Skin tear of right lower leg without complication, initial encounter       Medical Decision Making  After initial evaluation, the patient was neatly sent for CT scan of the brain before the suspicion of the possibility the patient is on Eliquis.  The patient's wife later did confirm that the patient does no longer take the medication.    Amount and/or Complexity of Data Reviewed  Labs:  Decision-making details documented in ED Course.  Radiology:  Decision-making details documented in ED Course.  ECG/medicine tests:  Decision-making details documented in ED Course.        Procedure  Laceration Repair    Performed by: Pardeep Foley DO  Authorized by: Pardeep Foley DO    Consent:     Consent obtained:  Verbal    Consent given by:  Patient    Risks discussed:  Infection and pain    Alternatives discussed:  No treatment  Universal protocol:     Patient identity confirmed:  Verbally with patient and arm band  Anesthesia:     Anesthesia method:  Local infiltration    Local anesthetic:  Lidocaine 1% WITH epi  Laceration details:     Location:  Face    Face location:  L eyebrow    Length (cm):  4  Pre-procedure details:     Preparation:  Patient was prepped and draped in usual sterile fashion  Exploration:     Wound exploration: entire depth of wound visualized      Wound extent: no fascia violation noted, no foreign bodies/material noted, no muscle damage noted and no tendon damage noted      Contaminated: no    Treatment:     Area cleansed with:  Chlorhexidine    Amount of cleaning:  Standard  Skin repair:     Repair method:  Sutures    Suture size:  4-0    Suture material:  Nylon    Suture technique:  Simple interrupted    Number of sutures:  7  Approximation:     Approximation:  Close  Repair type:     Repair type:  Simple  Post-procedure details:     Dressing:  Antibiotic ointment and sterile dressing    Procedure  completion:  Tolerated  Laceration Repair    Performed by: Pardeep Foley DO  Authorized by: Pardeep Foley DO    Consent:     Consent obtained:  Verbal    Consent given by:  Patient    Risks discussed:  Infection and pain    Alternatives discussed:  No treatment  Universal protocol:     Patient identity confirmed:  Hospital-assigned identification number, arm band and verbally with patient  Anesthesia:     Anesthesia method:  None  Laceration details:     Location:  Leg    Leg location:  R lower leg    Length (cm):  10  Pre-procedure details:     Preparation:  Patient was prepped and draped in usual sterile fashion  Treatment:     Area cleansed with:  Chlorhexidine and saline    Amount of cleaning:  Standard  Skin repair:     Repair method:  Steri-Strips    Number of Steri-Strips:  12  Approximation:     Approximation:  Close  Repair type:     Repair type:  Simple  Post-procedure details:     Dressing:  Bulky dressing    Procedure completion:  Tolerated            Pardeep Foley DO  01/19/24 0331

## 2024-01-11 ENCOUNTER — APPOINTMENT (OUTPATIENT)
Dept: PRIMARY CARE | Facility: CLINIC | Age: 84
End: 2024-01-11
Payer: MEDICARE

## 2024-01-16 ENCOUNTER — OFFICE VISIT (OUTPATIENT)
Dept: PRIMARY CARE | Facility: CLINIC | Age: 84
End: 2024-01-16
Payer: MEDICARE

## 2024-01-16 VITALS
WEIGHT: 189 LBS | HEIGHT: 66 IN | HEART RATE: 84 BPM | TEMPERATURE: 96.3 F | SYSTOLIC BLOOD PRESSURE: 130 MMHG | BODY MASS INDEX: 30.37 KG/M2 | OXYGEN SATURATION: 97 % | DIASTOLIC BLOOD PRESSURE: 82 MMHG

## 2024-01-16 DIAGNOSIS — G89.29 CHRONIC RIGHT-SIDED LOW BACK PAIN WITHOUT SCIATICA: ICD-10-CM

## 2024-01-16 DIAGNOSIS — T14.8XXD ENCOUNTER FOR RE-CHECK OF LACERATION WOUND: Primary | ICD-10-CM

## 2024-01-16 DIAGNOSIS — M54.50 CHRONIC RIGHT-SIDED LOW BACK PAIN WITHOUT SCIATICA: ICD-10-CM

## 2024-01-16 PROCEDURE — 3075F SYST BP GE 130 - 139MM HG: CPT | Performed by: INTERNAL MEDICINE

## 2024-01-16 PROCEDURE — 3079F DIAST BP 80-89 MM HG: CPT | Performed by: INTERNAL MEDICINE

## 2024-01-16 PROCEDURE — 1125F AMNT PAIN NOTED PAIN PRSNT: CPT | Performed by: INTERNAL MEDICINE

## 2024-01-16 PROCEDURE — 1159F MED LIST DOCD IN RCRD: CPT | Performed by: INTERNAL MEDICINE

## 2024-01-16 PROCEDURE — 99213 OFFICE O/P EST LOW 20 MIN: CPT | Performed by: INTERNAL MEDICINE

## 2024-01-16 PROCEDURE — 1036F TOBACCO NON-USER: CPT | Performed by: INTERNAL MEDICINE

## 2024-01-16 ASSESSMENT — PAIN SCALES - GENERAL: PAINLEVEL: 5

## 2024-01-16 ASSESSMENT — PATIENT HEALTH QUESTIONNAIRE - PHQ9
1. LITTLE INTEREST OR PLEASURE IN DOING THINGS: NOT AT ALL
SUM OF ALL RESPONSES TO PHQ9 QUESTIONS 1 AND 2: 0
2. FEELING DOWN, DEPRESSED OR HOPELESS: NOT AT ALL

## 2024-01-16 ASSESSMENT — ENCOUNTER SYMPTOMS
OCCASIONAL FEELINGS OF UNSTEADINESS: 0
DEPRESSION: 0
LOSS OF SENSATION IN FEET: 0

## 2024-01-16 NOTE — PROGRESS NOTES
"Subjective   Patient ID: Geovanni Carroll is a 83 y.o. male who presents for er follow up (Fell, stiches in head).    Patient is a 83-year-old male with a history of prostate cancer who is being seen for follow-up from the ER.  Patient was seen in the ER about a week ago after he had a fall.  Patient hit his head on the floor and had lacerated wound over his left eyebrow.  Patient also had wound over his leg and some abrasions.  Patient had CT scan of the head done in the ER which was negative for any acute abnormality.  Patient had sutures placed and patient is being seen for follow-up.  Wound is well-healed normal denies any complaints.         Review of Systems  .Negative for fever or chills  Negative for sore throat, ear pain, nasal discharge  Negative for cough, shortness of breath or wheezing  Negative for chest pain, palpitations, swelling of legs  Negative for abdominal pain, constipation, diarrhea, blood in the stools  Negative for urinary complaints  Negative for headache, dizziness, weakness or numbness  Positive for back pain  Negative for depression or anxiety  All other systems reviewed and were negative   Objective   /82   Pulse 84   Temp 35.7 °C (96.3 °F)   Ht 1.676 m (5' 6\")   Wt 85.7 kg (189 lb)   SpO2 97%   BMI 30.51 kg/m²     Physical Exam  Constitutional: Patient does not appear to be in any acute distress  HEENT sutured wound present over the left eyebrow  Cardiovascular: RRR, S1/S2, no murmurs, rubs, or gallops, no edema of extremities  Pulmonary: CTAB, no respiratory distress.  Abdomen: +BS, soft, non-tender, nondistended, no guarding  no palpable mass   MSK: Lacerated wound present over the leg.  Healing well..  Skin- No lesions, contusions, or erythema.  Peripheral puslses palpable bilaterally 2+  Neuro: AAO X3, Cranial nerves 2-12 grossly intact,DTR 2+ in all 4 limbs   Psychiatric: Judgment intact. Appropriate mood and behavior     Patient ID: Geovanni Carroll is a 83 y.o. male.    Suture " Removal    Date/Time: 1/16/2024 10:14 AM    Performed by: Tucker Flores MD  Authorized by: Tucker Flores MD    Consent:     Consent obtained:  Verbal    Consent given by:  Patient  Location:     Location:  Head/neck    Head/neck location:  Forehead  Procedure details:     Wound appearance:  No signs of infection and clean    Number of sutures removed:  7  Post-procedure details:     Post-removal:  Antibiotic ointment applied    Procedure completion:  Tolerated    Assessment/Plan   Diagnoses and all orders for this visit:  Encounter for re-check of laceration wound  Chronic right-sided low back pain without sciatica    Patient's sutures were removed.  Patient has some chronic back pain advised back exercises.

## 2024-02-15 ENCOUNTER — APPOINTMENT (OUTPATIENT)
Dept: PRIMARY CARE | Facility: CLINIC | Age: 84
End: 2024-02-15
Payer: MEDICARE

## 2024-02-15 PROBLEM — D69.6 DISORDER INVOLVING THROMBOCYTOPENIA (CMS-HCC): Status: RESOLVED | Noted: 2024-01-24 | Resolved: 2024-02-15

## 2024-02-19 ENCOUNTER — APPOINTMENT (OUTPATIENT)
Dept: PRIMARY CARE | Facility: CLINIC | Age: 84
End: 2024-02-19
Payer: MEDICARE

## 2024-02-19 ENCOUNTER — OFFICE VISIT (OUTPATIENT)
Dept: WOUND CARE | Facility: HOSPITAL | Age: 84
End: 2024-02-19
Payer: MEDICARE

## 2024-02-19 DIAGNOSIS — S81.801A OPEN WOUND OF RIGHT LOWER LEG, INITIAL ENCOUNTER: ICD-10-CM

## 2024-02-19 PROCEDURE — 99214 OFFICE O/P EST MOD 30 MIN: CPT

## 2024-02-22 DIAGNOSIS — I10 ESSENTIAL (PRIMARY) HYPERTENSION: ICD-10-CM

## 2024-02-22 PROBLEM — N39.0 URINARY TRACT INFECTION: Status: RESOLVED | Noted: 2023-12-29 | Resolved: 2024-02-22

## 2024-02-22 PROBLEM — Z85.830 HISTORY OF MALIGNANT NEOPLASM OF BONE: Status: RESOLVED | Noted: 2023-12-29 | Resolved: 2024-02-22

## 2024-02-22 RX ORDER — LOSARTAN POTASSIUM 25 MG/1
25 TABLET ORAL DAILY
Qty: 90 TABLET | Refills: 1 | Status: SHIPPED | OUTPATIENT
Start: 2024-02-22

## 2024-02-26 ENCOUNTER — OFFICE VISIT (OUTPATIENT)
Dept: WOUND CARE | Facility: HOSPITAL | Age: 84
End: 2024-02-26
Payer: MEDICARE

## 2024-02-26 PROCEDURE — 99213 OFFICE O/P EST LOW 20 MIN: CPT

## 2024-03-06 ENCOUNTER — APPOINTMENT (OUTPATIENT)
Dept: OPHTHALMOLOGY | Facility: CLINIC | Age: 84
End: 2024-03-06
Payer: MEDICARE

## 2024-03-12 PROBLEM — S81.801A OPEN WOUND OF RIGHT LOWER LEG: Status: RESOLVED | Noted: 2024-03-12 | Resolved: 2024-03-12

## 2024-04-02 ENCOUNTER — TELEPHONE (OUTPATIENT)
Dept: PRIMARY CARE | Facility: CLINIC | Age: 84
End: 2024-04-02
Payer: MEDICARE

## 2024-04-02 NOTE — TELEPHONE ENCOUNTER
Verbal given to [facility] for RN, PT, OT services. Advised last visit was 1/16/24 next visit 5/2. Advised pt will need to keep appt.

## 2024-04-30 PROBLEM — C61: Status: RESOLVED | Noted: 2024-04-02 | Resolved: 2024-04-30

## 2024-04-30 PROBLEM — K59.03 DRUG-INDUCED CONSTIPATION: Status: RESOLVED | Noted: 2024-03-26 | Resolved: 2024-04-30

## 2024-04-30 PROBLEM — R11.0 NAUSEA: Status: RESOLVED | Noted: 2024-03-26 | Resolved: 2024-04-30

## 2024-04-30 PROBLEM — G89.3 CANCER ASSOCIATED PAIN: Status: RESOLVED | Noted: 2024-03-26 | Resolved: 2024-04-30

## 2024-04-30 PROBLEM — M25.551 RIGHT HIP PAIN: Status: RESOLVED | Noted: 2024-03-26 | Resolved: 2024-04-30

## 2024-04-30 PROBLEM — C79.51: Status: RESOLVED | Noted: 2024-04-02 | Resolved: 2024-04-30

## 2024-04-30 PROBLEM — M79.18 GLUTEAL PAIN: Status: RESOLVED | Noted: 2024-03-25 | Resolved: 2024-04-30

## 2024-04-30 PROBLEM — C61 PROSTATE CANCER METASTATIC TO BONE (MULTI): Status: RESOLVED | Noted: 2024-03-26 | Resolved: 2024-04-30

## 2024-04-30 PROBLEM — R26.2 IMPAIRED AMBULATION: Status: RESOLVED | Noted: 2024-03-30 | Resolved: 2024-04-30

## 2024-04-30 PROBLEM — C79.51 PROSTATE CANCER METASTATIC TO BONE (MULTI): Status: RESOLVED | Noted: 2024-03-26 | Resolved: 2024-04-30

## 2024-05-02 ENCOUNTER — OFFICE VISIT (OUTPATIENT)
Dept: PRIMARY CARE | Facility: CLINIC | Age: 84
End: 2024-05-02
Payer: MEDICARE

## 2024-05-02 VITALS
TEMPERATURE: 96.8 F | HEIGHT: 66 IN | BODY MASS INDEX: 30.37 KG/M2 | DIASTOLIC BLOOD PRESSURE: 70 MMHG | OXYGEN SATURATION: 98 % | HEART RATE: 65 BPM | WEIGHT: 189 LBS | SYSTOLIC BLOOD PRESSURE: 130 MMHG

## 2024-05-02 DIAGNOSIS — D64.9 ANEMIA, UNSPECIFIED TYPE: Primary | ICD-10-CM

## 2024-05-02 DIAGNOSIS — E55.9 VITAMIN D DEFICIENCY: ICD-10-CM

## 2024-05-02 DIAGNOSIS — I10 PRIMARY HYPERTENSION: ICD-10-CM

## 2024-05-02 DIAGNOSIS — C61 PROSTATE CANCER (MULTI): ICD-10-CM

## 2024-05-02 DIAGNOSIS — R73.9 HYPERGLYCEMIA: ICD-10-CM

## 2024-05-02 DIAGNOSIS — R26.2 DIFFICULTY IN WALKING: ICD-10-CM

## 2024-05-02 PROCEDURE — 99215 OFFICE O/P EST HI 40 MIN: CPT | Performed by: INTERNAL MEDICINE

## 2024-05-02 PROCEDURE — 99214 OFFICE O/P EST MOD 30 MIN: CPT | Performed by: INTERNAL MEDICINE

## 2024-05-02 RX ORDER — FERROUS GLUCONATE 325 MG
38 TABLET ORAL
Qty: 90 TABLET | Refills: 3 | Status: SHIPPED | OUTPATIENT
Start: 2024-05-02 | End: 2025-05-02

## 2024-05-02 RX ORDER — BISMUTH SUBSALICYLATE 262 MG
1 TABLET,CHEWABLE ORAL DAILY
COMMUNITY

## 2024-05-02 RX ORDER — CALCIUM CARBONATE 160(400)MG
1 TABLET,CHEWABLE ORAL DAILY
Qty: 1 EACH | Refills: 0 | Status: SHIPPED | OUTPATIENT
Start: 2024-05-02

## 2024-05-02 ASSESSMENT — PATIENT HEALTH QUESTIONNAIRE - PHQ9
SUM OF ALL RESPONSES TO PHQ9 QUESTIONS 1 AND 2: 0
1. LITTLE INTEREST OR PLEASURE IN DOING THINGS: NOT AT ALL
2. FEELING DOWN, DEPRESSED OR HOPELESS: NOT AT ALL

## 2024-05-02 ASSESSMENT — ENCOUNTER SYMPTOMS
OCCASIONAL FEELINGS OF UNSTEADINESS: 1
DEPRESSION: 0

## 2024-05-02 ASSESSMENT — PAIN SCALES - GENERAL: PAINLEVEL: 0-NO PAIN

## 2024-05-02 NOTE — PROGRESS NOTES
[facility]: [facility]  PROGRESS NOTE      Geovanni Carroll is a 83 y.o. male that is being seen  today for Annual Exam.  Subjective   Pt. Is being seen for annual physical exam.Pt. has been doing well.Advance directives discussed with patient .  Patient is full code and he makes his own medical decisions  Denies any hospitalisation or urgent care visit.  Previous Labs reviewed .  Pt. Is upto date on screening exams and vaccination  Denies any falls or hospitalisation.     Patient is 83-year-old male with a history of prostate cancer with metastasis, hypertension, hyperlipidemia, gastroesophageal reflux disease, chronic low back pain who is being seen for annual physical examination.  Patient follows up with oncologist and his PSA levels have been stable.  Patient is on oral chemotherapy before and is on prednisone now.  Patient has difficulty in walking and has been getting physical therapy earlier.  Patient is up-to-date on vaccination.      ROS  Negative for fever or chills  Negative for sore throat, ear pain, nasal discharge  Negative for cough, shortness of breath or wheezing  Negative for chest pain, palpitations, swelling of legs  Negative for abdominal pain, constipation, diarrhea, blood in the stools  Negative for urinary complaints  Negative for headache, dizziness, weakness or numbness  Positive for back pain and difficulty in walking.  Negative for depression or anxiety  All other systems reviewed and were negative   Vitals:    05/02/24 1254   BP: 130/70   Pulse: 65   Temp: 36 °C (96.8 °F)   SpO2: 98%      Vitals:    05/02/24 1254   Weight: 85.7 kg (189 lb)     Body mass index is 30.51 kg/m².  Physical Exam  Constitutional: Patient does not appear to be in any acute distress  Head and Face: NCAT  Eyes: Normal external exam, EOMI  ENT: Normal external inspection of ears and nose. Oropharynx normal.  Cardiovascular: RRR, S1/S2, no murmurs, rubs, or gallops, radial pulses +2, no edema of  extremities  Pulmonary: CTAB, no respiratory distress.  Abdomen: +BS, soft, non-tender, nondistended, no guarding or rebound, no masses noted  MSK: No joint swelling, normal movements of all extremities. Range of motion- normal.  Skin- No lesions, contusions, or erythema.  Peripheral puslses palpable bilaterally 2+  Neuro: AAO X3, Cranial nerves 2-12 grossly intact,DTR 2+ in all 4 limbs   Psychiatric: Judgment intact. Appropriate mood and behavior    LABS   [unfilled]  Lab Results   Component Value Date    GLUCOSE 91 12/29/2023    CALCIUM 9.0 12/29/2023     (L) 12/29/2023    K 4.3 12/29/2023    CO2 22 (L) 12/29/2023    CL 97 12/29/2023    BUN 13 12/29/2023    CREATININE 0.80 12/29/2023     Lab Results   Component Value Date    ALT 15 12/29/2023    AST 21 12/29/2023    ALKPHOS 37 12/29/2023    BILITOT 0.4 12/29/2023     Lab Results   Component Value Date    WBC 4.4 12/29/2023    HGB 11.7 (L) 12/29/2023    HCT 32.2 (L) 12/29/2023    MCV 96 12/29/2023     (L) 12/29/2023     Lab Results   Component Value Date    CHOL 184 05/28/2021    CHOL 189 04/12/2018     Lab Results   Component Value Date    HDL 85 05/28/2021    HDL 63.7 04/12/2018     Lab Results   Component Value Date    LDLCALC 77 05/28/2021     Lab Results   Component Value Date    TRIG 112 05/28/2021    TRIG 95 04/12/2018     Lab Results   Component Value Date    HGBA1C 5.6 05/28/2021     Other labs not included in the list above were reviewed either before or during this encounter.    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    Drug-induced constipation  03/26/2024    Fatigue     Gastroesophageal reflux disease 08/28/2023    Gluteal pain 03/25/2024    Heartburn 01/09/2024    History of malignant neoplasm of bone 12/29/2023    HTN (hypertension)     Hyperglycemia     Impaired ambulation 03/30/2024    Insomnia 01/09/2024    Left foot drop     Low back pain 08/27/2014    Nausea 03/26/2024    Open wound of right lower leg 03/12/2024    Personal history of other diseases of the respiratory system 10/08/2017    History of acute bronchitis    Prostate cancer (Multi)     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  No Known Allergies  Current Outpatient Medications on File Prior to Visit   Medication Sig Dispense Refill    albuterol 108 (90 Base) MCG/ACT inhaler Inhale 2 puffs every 4 hours if needed for wheezing or shortness of breath.      docusate sodium (Colace) 100 mg capsule Take 1 capsule (100 mg) by mouth once daily as needed for constipation.      enzalutamide (Xtandi) 80 mg tablet Take 2 tablets (160 mg total) by mouth once daily.      fluticasone (Flonase) 50 mcg/actuation nasal spray Administer 2 sprays into each nostril if needed for allergies.      losartan (Cozaar) 25 mg tablet TAKE 1 TABLET BY MOUTH EVERY DAY 90 tablet 1    multivitamin tablet Take 1 tablet by mouth once daily.      omeprazole (PriLOSEC) 40 mg DR capsule Take 1 capsule (40 mg) by mouth once daily as needed.      polyethylene glycol (Miralax) 17 gram/dose powder Take 17 g by mouth once daily. mixed with 8 ounces of fluid AS NEEDED      predniSONE (Deltasone) 5 mg tablet Take 1 tablet (5 mg) by mouth 2 times a day.       traMADol (Ultram) 50 mg tablet Take 1 tablet (50 mg) by mouth once daily as needed for severe pain (7 - 10) or moderate pain (4 - 6).      [DISCONTINUED] [id number]-zinc--tia-bor 600 mg calcium- 800 unit-50 mg tablet Take 1 tablet by mouth 2 times a day with meals.       No current facility-administered medications on file prior to visit.     Immunization History   Administered Date(s) Administered    Flu vaccine, quadrivalent, high-dose, preservative free, age 65y+ (FLUZONE) 10/16/2020, 10/04/2021, 09/26/2022, 10/10/2023    Influenza, High Dose Seasonal, Preservative Free 11/04/2015, 10/24/2016, 10/21/2017, 10/16/2018, 10/21/2019    Influenza, Unspecified 10/16/2011, 10/08/2012, 10/17/2013    Influenza, injectable, quadrivalent 11/04/2015    Influenza, seasonal, injectable 10/17/2014    Moderna COVID-19 vaccine, Fall 2023, 12 yeasrs and older (50mcg/0.5mL) 10/20/2023    Moderna COVID-19 vaccine, bivalent, blue cap/gray label *Check age/dose* 10/17/2022    Moderna SARS-CoV-2 Vaccination 01/22/2021, 02/19/2021, 08/16/2021, 04/01/2022    Pneumococcal conjugate vaccine, 13-valent (PREVNAR 13) 04/23/2018    Pneumococcal polysaccharide vaccine, 23-valent, age 2 years and older (PNEUMOVAX 23) 03/24/2006, 07/01/2006, 01/01/2010, 07/13/2020    RSV, 60 Years And Older (AREXVY) 09/25/2023    Td vaccine, age 7 years and older (TDVAX) 01/01/1998    Tdap vaccine, age 7 year and older (BOOSTRIX, ADACEL) 04/29/2009    Zoster vaccine, recombinant, adult (SHINGRIX) 07/08/2020, 09/17/2020    Zoster, live 01/01/2009     Patient's medical history was reviewed and updated either before or during this encounter.  ASSESSMENT / PLAN:  Diagnoses and all orders for this visit:  Anemia, unspecified type  -     ferrous gluconate (Fergon) 324 (38 Fe) mg tablet; Take 1 tablet (38 mg of iron) by mouth once daily with breakfast.  Difficulty in walking  -     walker (Ultra-Light Rollator) misc; 1 Units once daily.  Primary  hypertension  Hyperglycemia  Vitamin D deficiency  Prostate cancer (Multi)          Tucker Flores MD

## 2024-05-09 ENCOUNTER — APPOINTMENT (OUTPATIENT)
Dept: PRIMARY CARE | Facility: CLINIC | Age: 84
End: 2024-05-09
Payer: MEDICARE

## 2024-05-14 ENCOUNTER — APPOINTMENT (OUTPATIENT)
Dept: PHYSICAL THERAPY | Facility: CLINIC | Age: 84
End: 2024-05-14
Payer: MEDICARE

## 2024-06-18 PROBLEM — R26.81 GAIT INSTABILITY: Status: ACTIVE | Noted: 2023-08-28

## 2024-06-18 PROBLEM — R53.81 PHYSICAL DEBILITY: Status: ACTIVE | Noted: 2024-05-20

## 2024-06-18 RX ORDER — FUROSEMIDE 20 MG/1
20 TABLET ORAL
COMMUNITY
Start: 2024-06-06

## 2024-06-18 RX ORDER — BUPRENORPHINE 10 UG/H
1 PATCH TRANSDERMAL WEEKLY
COMMUNITY
Start: 2024-06-11 | End: 2024-07-09

## 2024-06-18 RX ORDER — LIDOCAINE HYDROCHLORIDE 20 MG/ML
11 JELLY TOPICAL
COMMUNITY
Start: 2024-06-18 | End: 2024-07-18

## 2024-06-19 ENCOUNTER — OFFICE VISIT (OUTPATIENT)
Dept: PRIMARY CARE | Facility: CLINIC | Age: 84
End: 2024-06-19
Payer: MEDICARE

## 2024-06-19 VITALS
WEIGHT: 197 LBS | BODY MASS INDEX: 31.66 KG/M2 | HEART RATE: 83 BPM | HEIGHT: 66 IN | TEMPERATURE: 97.3 F | DIASTOLIC BLOOD PRESSURE: 62 MMHG | SYSTOLIC BLOOD PRESSURE: 140 MMHG | OXYGEN SATURATION: 97 %

## 2024-06-19 DIAGNOSIS — H11.31 CONJUNCTIVAL HEMORRHAGE OF RIGHT EYE: ICD-10-CM

## 2024-06-19 DIAGNOSIS — I10 PRIMARY HYPERTENSION: Primary | ICD-10-CM

## 2024-06-19 DIAGNOSIS — D69.6 THROMBOCYTOPENIA (CMS-HCC): ICD-10-CM

## 2024-06-19 DIAGNOSIS — C61 PROSTATE CANCER (MULTI): ICD-10-CM

## 2024-06-19 DIAGNOSIS — R31.0 GROSS HEMATURIA: ICD-10-CM

## 2024-06-19 DIAGNOSIS — R53.81 PHYSICAL DEBILITY: ICD-10-CM

## 2024-06-19 DIAGNOSIS — D64.9 ANEMIA, UNSPECIFIED TYPE: ICD-10-CM

## 2024-06-19 DIAGNOSIS — M79.89 LEG SWELLING: ICD-10-CM

## 2024-06-19 PROBLEM — K21.9 GASTROESOPHAGEAL REFLUX DISEASE: Status: ACTIVE | Noted: 2024-06-19

## 2024-06-19 PROCEDURE — 99214 OFFICE O/P EST MOD 30 MIN: CPT | Performed by: INTERNAL MEDICINE

## 2024-06-19 RX ORDER — HYDROMORPHONE HYDROCHLORIDE 4 MG/1
TABLET ORAL
COMMUNITY

## 2024-06-19 RX ORDER — LISINOPRIL 30 MG/1
TABLET ORAL
COMMUNITY
End: 2024-06-19 | Stop reason: ALTCHOICE

## 2024-06-19 RX ORDER — GABAPENTIN 300 MG/1
CAPSULE ORAL
COMMUNITY
Start: 2024-05-20

## 2024-06-19 RX ORDER — ACETAMINOPHEN 500 MG
TABLET ORAL EVERY 6 HOURS PRN
COMMUNITY

## 2024-06-19 RX ORDER — CEPHALEXIN 500 MG/1
CAPSULE ORAL
COMMUNITY
Start: 2024-02-12

## 2024-06-19 RX ORDER — METRONIDAZOLE 7.5 MG/G
GEL TOPICAL 2 TIMES DAILY
COMMUNITY
Start: 2024-05-24

## 2024-06-19 ASSESSMENT — ENCOUNTER SYMPTOMS
DEPRESSION: 0
LOSS OF SENSATION IN FEET: 0
OCCASIONAL FEELINGS OF UNSTEADINESS: 1

## 2024-06-19 ASSESSMENT — PAIN SCALES - GENERAL: PAINLEVEL: 2

## 2024-06-19 NOTE — PROGRESS NOTES
[facility]: [facility]  PROGRESS NOTE      Geovanni Carroll is a 84 y.o. male that is being seen  today for Follow-up ([facility] 6/5/24, swollen legs, urinating blood, upon waking right side hip pain eases thru day, 6 days ago straining blood shot eye).  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  Patient was seen in the ER with leg swelling and blood in the urine.  Patient does follow-up with a urologist and has appointment scheduled for hematuria and possible cystoscopy.  Patient was evaluated in the ER and labs reviewed.  Patient has mild anemia.  Creatinine levels are normal.  Chest x-ray was normal.  Patient was started on Lasix.  Patient also was diagnosed with UTI and treated with Keflex.  Patient is accompanied by his wife.  Patient also has in his right eye after he strained too hard for constipation.  Denies any change in the vision.  Patient is on oral chemotherapy as well as on oral prednisone for prostate cancer.  Denies any shortness of breath no PND or orthopnea.  Patient also has gained around 8 to 9 pounds from the last visit.  Patient also has been seen by palliative care team and is on pain medications for better control of pain.      ROS  Negative for fever or chills  Negative for sore throat, ear pain, nasal discharge  Negative for cough, shortness of breath or wheezing  Negative for chest pain, palpitations, positive for swelling of legs  Negative for abdominal pain, constipation, diarrhea, blood in the stools  Negative for urinary complaints  Negative for headache, dizziness, weakness or numbness  Positive for back pain and difficulty in walking.  Patient uses walker  Negative for depression or anxiety  All other systems reviewed and were negative   Vitals:    06/19/24 1311   BP: 140/62   Pulse: 83   Temp:  36.3 °C (97.3 °F)   SpO2: 97%      Vitals:    06/19/24 1311   Weight: 89.4 kg (197 lb)     Body mass index is 31.8 kg/m².  Physical Exam  Constitutional: Patient does not appear to be in any acute distress  Head and Face: NCAT  Eyes: Normal external exam, EOMI  ENT: Normal external inspection of ears and nose. Oropharynx normal.  Cardiovascular: RRR, S1/S2, no murmurs, rubs, or gallops, radial pulses +2, bilateral 2+ edema in the lower extremities pulmonary: CTAB, no respiratory distress.  Abdomen: +BS, soft, non-tender, nondistended, no guarding or rebound, no masses noted  MSK: Difficulty in walking due to back pain  Skin- No lesions, contusions, or erythema.  Peripheral puslses palpable bilaterally 2+  Neuro: AAO X3, Cranial nerves 2-12 grossly intact,DTR 2+ in all 4 limbs   Psychiatric: Judgment intact. Appropriate mood and behavior    LABS   [unfilled]  Lab Results   Component Value Date    GLUCOSE 91 12/29/2023    CALCIUM 9.0 12/29/2023     (L) 12/29/2023    K 4.3 12/29/2023    CO2 22 (L) 12/29/2023    CL 97 12/29/2023    BUN 13 12/29/2023    CREATININE 0.80 12/29/2023     Lab Results   Component Value Date    ALT 15 12/29/2023    AST 21 12/29/2023    ALKPHOS 37 12/29/2023    BILITOT 0.4 12/29/2023     Lab Results   Component Value Date    WBC 4.4 12/29/2023    HGB 11.7 (L) 12/29/2023    HCT 32.2 (L) 12/29/2023    MCV 96 12/29/2023     (L) 12/29/2023     Lab Results   Component Value Date    CHOL 184 05/28/2021    CHOL 189 04/12/2018     Lab Results   Component Value Date    HDL 85 05/28/2021    HDL 63.7 04/12/2018     Lab Results   Component Value Date    LDLCALC 77 05/28/2021     Lab Results   Component Value Date    TRIG 112 05/28/2021    TRIG 95 04/12/2018     Lab Results   Component Value Date    HGBA1C 5.6 05/28/2021     Other labs not included in the list above were reviewed either before or during this encounter.    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) (Multi) 04/02/2024    Drug-induced constipation 03/26/2024    Fatigue     Gastroesophageal reflux disease 08/28/2023    Gluteal pain 03/25/2024    Heartburn 01/09/2024    History of malignant neoplasm of bone 12/29/2023    HTN (hypertension)     Hyperglycemia     Impaired ambulation 03/30/2024    Insomnia 01/09/2024    Left foot drop     Low back pain 08/27/2014    Nausea 03/26/2024    Open wound of right lower leg 03/12/2024    Personal history of other diseases of the respiratory system 10/08/2017    History of acute bronchitis    Prostate cancer (Multi)     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  No Known Allergies  Current Outpatient Medications on File Prior to Visit   Medication Sig Dispense Refill    acetaminophen (Tylenol) 500 mg tablet Take by mouth every 6 hours if needed for mild pain (1 - 3).      albuterol 108 (90 Base) MCG/ACT inhaler Inhale 2 puffs every 4 hours if needed for wheezing or shortness of breath.      buprenorphine (Butrans) 10 mcg/hour patch Place 1 patch on the skin once a week.      docusate sodium (Colace) 100 mg capsule Take 1 capsule (100 mg) by mouth once daily as needed for constipation.      enzalutamide (Xtandi) 80 mg tablet Take 2 tablets (160 mg total) by mouth  once daily.      fluticasone (Flonase) 50 mcg/actuation nasal spray Administer 2 sprays into each nostril if needed for allergies.      furosemide (Lasix) 20 mg tablet Take 1 tablet (20 mg) by mouth once daily.      gabapentin (Neurontin) 300 mg capsule Take 1 capsule by mouth every morning AND 1 capsule every afternoon AND 2 capsules daily at bedtime. Do all this for 30 days.      HYDROmorphone (Dilaudid) 4 mg tablet Take by mouth.      losartan (Cozaar) 25 mg tablet TAKE 1 TABLET BY MOUTH EVERY DAY 90 tablet 1    metroNIDAZOLE (Metrogel) 0.75 % gel Apply topically 2 times a day. to face      multivitamin tablet Take 1 tablet by mouth once daily.      omeprazole (PriLOSEC) 40 mg DR capsule Take 1 capsule (40 mg) by mouth once daily as needed.      polyethylene glycol (Miralax) 17 gram/dose powder Take 17 g by mouth once daily. mixed with 8 ounces of fluid AS NEEDED      predniSONE (Deltasone) 5 mg tablet Take 1 tablet (5 mg) by mouth 2 times a day.      walker (Ultra-Light Rollator) misc 1 Units once daily. 1 each 0    cephalexin (Keflex) 500 mg capsule Take by mouth.      ferrous gluconate (Fergon) 324 (38 Fe) mg tablet Take 1 tablet (38 mg of iron) by mouth once daily with breakfast. (Patient not taking: Reported on 6/19/2024) 90 tablet 3    lidocaine 2 % mucosal jelly (Uro-Jet) Insert 11 mL (2.2 Applications) into the urethra.      traMADol (Ultram) 50 mg tablet Take 1 tablet (50 mg) by mouth once daily as needed for severe pain (7 - 10) or moderate pain (4 - 6).      [DISCONTINUED] lisinopril 30 mg tablet Take by mouth.       No current facility-administered medications on file prior to visit.     Immunization History   Administered Date(s) Administered    Flu vaccine, quadrivalent, high-dose, preservative free, age 65y+ (FLUZONE) 10/16/2020, 10/04/2021, 09/26/2022, 10/10/2023    Influenza, High Dose Seasonal, Preservative Free 11/04/2015, 10/24/2016, 10/21/2017, 10/16/2018, 10/21/2019    Influenza, Unspecified  10/16/2011, 10/08/2012, 10/17/2013    Influenza, injectable, quadrivalent 11/04/2015    Influenza, seasonal, injectable 10/17/2014    Moderna COVID-19 vaccine, Fall 2023, 12 yeasrs and older (50mcg/0.5mL) 10/20/2023    Moderna COVID-19 vaccine, bivalent, blue cap/gray label *Check age/dose* 10/17/2022    Moderna SARS-CoV-2 Vaccination 01/22/2021, 02/19/2021, 08/16/2021, 04/01/2022    Pneumococcal conjugate vaccine, 13-valent (PREVNAR 13) 04/23/2018    Pneumococcal polysaccharide vaccine, 23-valent, age 2 years and older (PNEUMOVAX 23) 03/24/2006, 07/01/2006, 01/01/2010, 07/13/2020    RSV, 60 Years And Older (AREXVY) 09/25/2023    Td vaccine, age 7 years and older (TDVAX) 01/01/1998    Tdap vaccine, age 7 year and older (BOOSTRIX, ADACEL) 04/29/2009    Zoster vaccine, recombinant, adult (SHINGRIX) 07/08/2020, 09/17/2020    Zoster, live 01/01/2009     Patient's medical history was reviewed and updated either before or during this encounter.  ASSESSMENT / PLAN:  Diagnoses and all orders for this visit:  Primary hypertension  Thrombocytopenia (CMS-HCC)  Gross hematuria  Prostate cancer (Multi)  Physical debility  Conjunctival hemorrhage of right eye  Leg swelling  Anemia, unspecified type  Patient is being seen for follow-up from the ER.  Patient has leg swelling and has been started on Lasix.  Chest x-ray was negative for any vascular congestion.  Patient has anemia with hemoglobin of 10.  Patient does have prostate cancer and follows up with Dr. Milner.  Patient kidney functions have been stable.  Patient does have blood in the urine and is being seen by urologist and is planned to have cystoscopy done in the next few days.  Patient already has been treated with antibiotics for UTI in the ER  Patient started on Ace wraps and recommended patient to keep his legs elevated.  Also recommended patient to eat more proteins and cut down on the fluids to 40 to 48 ounces.        Tucker Flores MD

## 2024-06-25 ENCOUNTER — TELEPHONE (OUTPATIENT)
Dept: PRIMARY CARE | Facility: CLINIC | Age: 84
End: 2024-06-25
Payer: MEDICARE

## 2024-06-25 NOTE — TELEPHONE ENCOUNTER
Pt states Dr Flores told him to put wraps around his legs.  Pt would like to know if it's supposed to be done all the time?  Pt has been taking them off at night, and putting them back on in the morning.  Pt states when he wakes up both legs are swollen, please advice.     [phone]

## 2024-06-28 ENCOUNTER — TELEPHONE (OUTPATIENT)
Dept: PRIMARY CARE | Facility: CLINIC | Age: 84
End: 2024-06-28
Payer: MEDICARE

## 2024-06-28 NOTE — TELEPHONE ENCOUNTER
LV 6/19/24, NV 11/12/24    I gave Pavel a verbal to follow for home care PT and OT. Pt is still at [facility].  Pt should be discharged over the weekend.

## 2024-07-15 ENCOUNTER — APPOINTMENT (OUTPATIENT)
Dept: CARDIOLOGY | Facility: HOSPITAL | Age: 84
End: 2024-07-15
Payer: MEDICARE

## 2024-07-15 ENCOUNTER — HOSPITAL ENCOUNTER (EMERGENCY)
Facility: HOSPITAL | Age: 84
Discharge: HOME | End: 2024-07-15
Attending: STUDENT IN AN ORGANIZED HEALTH CARE EDUCATION/TRAINING PROGRAM
Payer: MEDICARE

## 2024-07-15 VITALS
OXYGEN SATURATION: 97 % | SYSTOLIC BLOOD PRESSURE: 120 MMHG | HEIGHT: 67 IN | WEIGHT: 197.31 LBS | RESPIRATION RATE: 15 BRPM | HEART RATE: 82 BPM | TEMPERATURE: 96.3 F | BODY MASS INDEX: 30.97 KG/M2 | DIASTOLIC BLOOD PRESSURE: 72 MMHG

## 2024-07-15 DIAGNOSIS — R31.9 HEMATURIA, UNSPECIFIED TYPE: ICD-10-CM

## 2024-07-15 DIAGNOSIS — D64.9 ANEMIA, UNSPECIFIED TYPE: Primary | ICD-10-CM

## 2024-07-15 LAB
ABO GROUP (TYPE) IN BLOOD: NORMAL
ABO GROUP (TYPE) IN BLOOD: NORMAL
ALBUMIN SERPL-MCNC: 3.8 G/DL (ref 3.5–5)
ALP BLD-CCNC: 66 U/L (ref 35–125)
ALT SERPL-CCNC: 17 U/L (ref 5–40)
ANION GAP SERPL CALC-SCNC: 11 MMOL/L
ANTIBODY SCREEN: NORMAL
APPEARANCE UR: ABNORMAL
AST SERPL-CCNC: 55 U/L (ref 5–40)
BASOPHILS # BLD MANUAL: 0.04 X10*3/UL (ref 0–0.1)
BASOPHILS NFR BLD MANUAL: 1 %
BILIRUB SERPL-MCNC: 0.3 MG/DL (ref 0.1–1.2)
BILIRUB UR STRIP.AUTO-MCNC: NEGATIVE MG/DL
BLOOD EXPIRATION DATE: NORMAL
BUN SERPL-MCNC: 23 MG/DL (ref 8–25)
CALCIUM SERPL-MCNC: 8.6 MG/DL (ref 8.5–10.4)
CHLORIDE SERPL-SCNC: 98 MMOL/L (ref 97–107)
CO2 SERPL-SCNC: 25 MMOL/L (ref 24–31)
COLOR UR: ABNORMAL
CREAT SERPL-MCNC: 1 MG/DL (ref 0.4–1.6)
DISPENSE STATUS: NORMAL
EGFRCR SERPLBLD CKD-EPI 2021: 73 ML/MIN/1.73M*2
EOSINOPHIL # BLD MANUAL: 0.2 X10*3/UL (ref 0–0.4)
EOSINOPHIL NFR BLD MANUAL: 5 %
ERYTHROCYTE [DISTWIDTH] IN BLOOD BY AUTOMATED COUNT: 16.6 % (ref 11.5–14.5)
GLUCOSE SERPL-MCNC: 98 MG/DL (ref 65–99)
GLUCOSE UR STRIP.AUTO-MCNC: NORMAL MG/DL
HCT VFR BLD AUTO: 20.6 % (ref 41–52)
HGB BLD-MCNC: 7 G/DL (ref 13.5–17.5)
IMM GRANULOCYTES # BLD AUTO: 0.35 X10*3/UL (ref 0–0.5)
IMM GRANULOCYTES NFR BLD AUTO: 9 % (ref 0–0.9)
KETONES UR STRIP.AUTO-MCNC: NEGATIVE MG/DL
LEUKOCYTE ESTERASE UR QL STRIP.AUTO: ABNORMAL
LYMPHOCYTES # BLD MANUAL: 0.59 X10*3/UL (ref 0.8–3)
LYMPHOCYTES NFR BLD MANUAL: 15 %
MCH RBC QN AUTO: 30.8 PG (ref 26–34)
MCHC RBC AUTO-ENTMCNC: 34 G/DL (ref 32–36)
MCV RBC AUTO: 91 FL (ref 80–100)
METAMYELOCYTES # BLD MANUAL: 0.04 X10*3/UL
METAMYELOCYTES NFR BLD MANUAL: 1 %
MONOCYTES # BLD MANUAL: 0.12 X10*3/UL (ref 0.05–0.8)
MONOCYTES NFR BLD MANUAL: 3 %
MYELOCYTES # BLD MANUAL: 0.04 X10*3/UL
MYELOCYTES NFR BLD MANUAL: 1 %
NEUTROPHILS # BLD MANUAL: 2.89 X10*3/UL (ref 1.6–5.5)
NEUTS BAND # BLD MANUAL: 0.08 X10*3/UL (ref 0–0.5)
NEUTS BAND NFR BLD MANUAL: 2 %
NEUTS SEG # BLD MANUAL: 2.81 X10*3/UL (ref 1.6–5)
NEUTS SEG NFR BLD MANUAL: 72 %
NITRITE UR QL STRIP.AUTO: NEGATIVE
NRBC BLD-RTO: 0 /100 WBCS (ref 0–0)
PATH REVIEW-CBC DIFFERENTIAL: NORMAL
PH UR STRIP.AUTO: 6 [PH]
PLATELET # BLD AUTO: 47 X10*3/UL (ref 150–450)
POTASSIUM SERPL-SCNC: 4 MMOL/L (ref 3.4–5.1)
PRODUCT BLOOD TYPE: 600
PRODUCT CODE: NORMAL
PROT SERPL-MCNC: 5.5 G/DL (ref 5.9–7.9)
PROT UR STRIP.AUTO-MCNC: ABNORMAL MG/DL
RBC # BLD AUTO: 2.27 X10*6/UL (ref 4.5–5.9)
RBC # UR STRIP.AUTO: ABNORMAL /UL
RBC #/AREA URNS AUTO: >20 /HPF
RBC MORPH BLD: ABNORMAL
RH FACTOR (ANTIGEN D): NORMAL
RH FACTOR (ANTIGEN D): NORMAL
SODIUM SERPL-SCNC: 134 MMOL/L (ref 133–145)
SP GR UR STRIP.AUTO: 1.02
TOTAL CELLS COUNTED BLD: 100
TROPONIN T SERPL-MCNC: 41 NG/L
TROPONIN T SERPL-MCNC: 42 NG/L
UNIT ABO: NORMAL
UNIT NUMBER: NORMAL
UNIT RH: NORMAL
UNIT VOLUME: 400
UROBILINOGEN UR STRIP.AUTO-MCNC: NORMAL MG/DL
WBC # BLD AUTO: 3.9 X10*3/UL (ref 4.4–11.3)
WBC #/AREA URNS AUTO: >50 /HPF
WBC CLUMPS #/AREA URNS AUTO: ABNORMAL /HPF
XM INTEP: NORMAL

## 2024-07-15 PROCEDURE — 36415 COLL VENOUS BLD VENIPUNCTURE: CPT | Performed by: STUDENT IN AN ORGANIZED HEALTH CARE EDUCATION/TRAINING PROGRAM

## 2024-07-15 PROCEDURE — 86920 COMPATIBILITY TEST SPIN: CPT

## 2024-07-15 PROCEDURE — 85060 BLOOD SMEAR INTERPRETATION: CPT | Performed by: PATHOLOGY

## 2024-07-15 PROCEDURE — 99285 EMERGENCY DEPT VISIT HI MDM: CPT | Mod: 25

## 2024-07-15 PROCEDURE — 81001 URINALYSIS AUTO W/SCOPE: CPT | Performed by: STUDENT IN AN ORGANIZED HEALTH CARE EDUCATION/TRAINING PROGRAM

## 2024-07-15 PROCEDURE — 85027 COMPLETE CBC AUTOMATED: CPT | Performed by: STUDENT IN AN ORGANIZED HEALTH CARE EDUCATION/TRAINING PROGRAM

## 2024-07-15 PROCEDURE — 84484 ASSAY OF TROPONIN QUANT: CPT | Performed by: STUDENT IN AN ORGANIZED HEALTH CARE EDUCATION/TRAINING PROGRAM

## 2024-07-15 PROCEDURE — 87086 URINE CULTURE/COLONY COUNT: CPT | Mod: TRILAB | Performed by: STUDENT IN AN ORGANIZED HEALTH CARE EDUCATION/TRAINING PROGRAM

## 2024-07-15 PROCEDURE — 86901 BLOOD TYPING SEROLOGIC RH(D): CPT | Performed by: STUDENT IN AN ORGANIZED HEALTH CARE EDUCATION/TRAINING PROGRAM

## 2024-07-15 PROCEDURE — 93005 ELECTROCARDIOGRAM TRACING: CPT

## 2024-07-15 PROCEDURE — P9016 RBC LEUKOCYTES REDUCED: HCPCS

## 2024-07-15 PROCEDURE — 36430 TRANSFUSION BLD/BLD COMPNT: CPT

## 2024-07-15 PROCEDURE — 85007 BL SMEAR W/DIFF WBC COUNT: CPT | Performed by: STUDENT IN AN ORGANIZED HEALTH CARE EDUCATION/TRAINING PROGRAM

## 2024-07-15 PROCEDURE — 81003 URINALYSIS AUTO W/O SCOPE: CPT | Performed by: STUDENT IN AN ORGANIZED HEALTH CARE EDUCATION/TRAINING PROGRAM

## 2024-07-15 PROCEDURE — 80053 COMPREHEN METABOLIC PANEL: CPT | Performed by: STUDENT IN AN ORGANIZED HEALTH CARE EDUCATION/TRAINING PROGRAM

## 2024-07-15 ASSESSMENT — PAIN SCALES - GENERAL: PAINLEVEL_OUTOF10: 0 - NO PAIN

## 2024-07-15 ASSESSMENT — PAIN - FUNCTIONAL ASSESSMENT: PAIN_FUNCTIONAL_ASSESSMENT: 0-10

## 2024-07-15 NOTE — DISCHARGE INSTRUCTIONS
You should follow-up with your primary care physician for recheck of your blood work in the next several days.  Return to the emergency department with any worsening symptoms.    It is important to remember that your care does not end here and you must continue to monitor your condition closely. Please return to the emergency department for any worsening or concerning signs or symptoms as directed by our conversations and the discharge instructions. If you do not have a doctor please contact the referral number on your discharge instructions. Please contact any physician specialists provided in your discharge notes as it is very important to follow up with them regarding your condition. If you are unable to reach the physicians provided, please come back to the Emergency Department at any time.    Return to emergency room without delay for ANY new or worsening pains or for any other symptoms or concerns.  Return with worsening pains, nausea, vomiting, trouble breathing, palpitations, shortness of breath, inability to pass stool or urine, loss of control of stool or urine, any numbness or tingling (that is not normal for you), uncontrolled fevers, the passing of blood or other material in stool or urine, rashes, pains or for any other symptoms or concerns you may have.  You are always welcome to return to the ER at any time for any reason or for any other concerns you may have.

## 2024-07-15 NOTE — ED PROVIDER NOTES
HPI   Chief Complaint   Patient presents with    Weakness, Gen     Pt sent in from facility due to low hemoglobin. Pt had recent urological procedure and is having bloody urine through his chino catheter.        Patient sent from his facility due to low hemoglobin.  He has been having gradual hematuria since his urinary procedure performed several weeks ago.  Laboratory studies were drawn from yesterday which revealed hemoglobin less than 7 so patient was sent in for transfusion.  He did have transfusion previously for this as well.  He has been following up with his urologist.                          [place] Coma Scale Score: 15                     Patient History   Past Medical History:   Diagnosis Date    Acute lumbar radiculopathy     Acute upper respiratory infection, unspecified 10/08/2017    Acute upper respiratory infection    Allergic rhinitis     Blurring of visual image 01/09/2024    Cancer associated pain 03/26/2024    Constipation     Disorder involving thrombocytopenia (CMS-HCC) 01/24/2024    Distant metastasis to bone by neoplasm of prostate (pM1b) (Multi) 04/02/2024    Drug-induced constipation 03/26/2024    Fatigue     Gastroesophageal reflux disease 08/28/2023    Gluteal pain 03/25/2024    Heartburn 01/09/2024    History of malignant neoplasm of bone 12/29/2023    HTN (hypertension)     Hyperglycemia     Impaired ambulation 03/30/2024    Insomnia 01/09/2024    Left foot drop     Low back pain 08/27/2014    Nausea 03/26/2024    Open wound of right lower leg 03/12/2024    Personal history of other diseases of the respiratory system 10/08/2017    History of acute bronchitis    Prostate cancer (Multi)     Prostate cancer metastatic to bone (Multi) 03/26/2024    Right hip pain 03/26/2024    Seasonal allergies     Spondylolisthesis     Unstable gait     Urinary tract infection 12/29/2023    Vertigo     Vitamin D deficiency      Past Surgical History:   Procedure Laterality Date    OTHER SURGICAL HISTORY   2020    Cataract surgery    PROSTATE SURGERY  2015    Prostate Surgery     No family history on file.  Social History     Tobacco Use    Smoking status: Former     Current packs/day: 0.00     Types: Cigarettes     Quit date:      Years since quittin.5     Passive exposure: Past    Smokeless tobacco: Never   Vaping Use    Vaping status: Never Used   Substance Use Topics    Alcohol use: Yes     Alcohol/week: 7.0 standard drinks of alcohol     Types: 7 Glasses of wine per week    Drug use: Never       Physical Exam   ED Triage Vitals [07/15/24 1023]   Temperature Heart Rate Respirations BP   36.9 °C (98.4 °F) 97 15 123/64      Pulse Ox Temp Source Heart Rate Source Patient Position   100 % Oral Monitor Lying      BP Location FiO2 (%)     Left arm --       Physical Exam  HENT:      Head: Normocephalic.   Eyes:      General: No scleral icterus.  Cardiovascular:      Rate and Rhythm: Normal rate.   Pulmonary:      Effort: Pulmonary effort is normal.   Abdominal:      Comments: Soft, nontender to palpation.  Hui catheter in place.   Neurological:      Mental Status: He is alert.         ED Course & MDM   ED Course as of 07/15/24 1449   Mon Jul 15, 2024   1027 EKG interpreted by me: Sinus rhythm with arrhythmia, rate 78.  Normal axis.  No ST or T wave abnormalities. [ML]      ED Course User Index  [ML] Newton Hendrix MD         Diagnoses as of 07/15/24 1449   Anemia, unspecified type   Hematuria, unspecified type       Medical Decision Making  Patient with minimal to no symptoms.  Patient given blood transfusion and was advised to follow-up with urology and primary care physician.  Return precautions given for any worsening symptoms.  Parts of this chart were completed with dictation software, please excuse any errors in transcription.        Procedure  Procedures     Newton Hendrix MD  07/15/24 3823

## 2024-07-16 LAB
ATRIAL RATE: 78 BPM
P AXIS: 63 DEGREES
PR INTERVAL: 168 MS
Q ONSET: 216 MS
QRS COUNT: 13 BEATS
QRS DURATION: 90 MS
QT INTERVAL: 386 MS
QTC CALCULATION(BAZETT): 440 MS
QTC FREDERICIA: 421 MS
R AXIS: 22 DEGREES
T AXIS: 37 DEGREES
T OFFSET: 409 MS
VENTRICULAR RATE: 78 BPM

## 2024-07-17 LAB — BACTERIA UR CULT: ABNORMAL

## 2024-07-18 ENCOUNTER — TELEPHONE (OUTPATIENT)
Dept: PHARMACY | Facility: HOSPITAL | Age: 84
End: 2024-07-18
Payer: MEDICARE

## 2024-07-18 NOTE — PROGRESS NOTES
EDPD Note: Lab/Chart Reviewed    Reviewed Mr./Mrs./Ms. Geovanni Carroll 's chart regarding a positive urine contaminant culture/result that was taken during their recent emergency room visit. The patient was transferred back to their rehab/LTC facility .Therefore, I have faxed this information to [facility] at fax number [phone] .        No further follow up needed from EDPD Team.     Ermelinda Mojica, CaroleD

## 2024-07-22 ENCOUNTER — HOSPITAL ENCOUNTER (EMERGENCY)
Facility: HOSPITAL | Age: 84
Discharge: HOME | End: 2024-07-22
Attending: STUDENT IN AN ORGANIZED HEALTH CARE EDUCATION/TRAINING PROGRAM
Payer: MEDICARE

## 2024-07-22 VITALS
OXYGEN SATURATION: 98 % | HEART RATE: 91 BPM | HEIGHT: 67 IN | RESPIRATION RATE: 18 BRPM | WEIGHT: 200 LBS | TEMPERATURE: 98.2 F | SYSTOLIC BLOOD PRESSURE: 150 MMHG | DIASTOLIC BLOOD PRESSURE: 43 MMHG | BODY MASS INDEX: 31.39 KG/M2

## 2024-07-22 DIAGNOSIS — D64.9 ANEMIA, UNSPECIFIED TYPE: ICD-10-CM

## 2024-07-22 DIAGNOSIS — R31.9 HEMATURIA, UNSPECIFIED TYPE: Primary | ICD-10-CM

## 2024-07-22 LAB
ABO GROUP (TYPE) IN BLOOD: NORMAL
ALBUMIN SERPL-MCNC: 4.1 G/DL (ref 3.5–5)
ALP BLD-CCNC: 65 U/L (ref 35–125)
ALT SERPL-CCNC: 11 U/L (ref 5–40)
ANION GAP SERPL CALC-SCNC: 14 MMOL/L
ANTIBODY SCREEN: NORMAL
APPEARANCE UR: ABNORMAL
AST SERPL-CCNC: 64 U/L (ref 5–40)
BASOPHILS # BLD MANUAL: 0 X10*3/UL (ref 0–0.1)
BASOPHILS NFR BLD MANUAL: 0 %
BILIRUB SERPL-MCNC: 0.3 MG/DL (ref 0.1–1.2)
BILIRUB UR STRIP.AUTO-MCNC: NEGATIVE MG/DL
BLOOD EXPIRATION DATE: NORMAL
BUN SERPL-MCNC: 28 MG/DL (ref 8–25)
CALCIUM SERPL-MCNC: 9.2 MG/DL (ref 8.5–10.4)
CHLORIDE SERPL-SCNC: 96 MMOL/L (ref 97–107)
CO2 SERPL-SCNC: 24 MMOL/L (ref 24–31)
COLOR UR: ABNORMAL
CREAT SERPL-MCNC: 1.1 MG/DL (ref 0.4–1.6)
DACRYOCYTES BLD QL SMEAR: ABNORMAL
DISPENSE STATUS: NORMAL
EGFRCR SERPLBLD CKD-EPI 2021: 65 ML/MIN/1.73M*2
EOSINOPHIL # BLD MANUAL: 0.12 X10*3/UL (ref 0–0.4)
EOSINOPHIL NFR BLD MANUAL: 3 %
ERYTHROCYTE [DISTWIDTH] IN BLOOD BY AUTOMATED COUNT: 17.2 % (ref 11.5–14.5)
GLUCOSE SERPL-MCNC: 125 MG/DL (ref 65–99)
GLUCOSE UR STRIP.AUTO-MCNC: NORMAL MG/DL
HCT VFR BLD AUTO: 21.4 % (ref 41–52)
HGB BLD-MCNC: 7 G/DL (ref 13.5–17.5)
IMM GRANULOCYTES # BLD AUTO: 0.38 X10*3/UL (ref 0–0.5)
IMM GRANULOCYTES NFR BLD AUTO: 9.7 % (ref 0–0.9)
KETONES UR STRIP.AUTO-MCNC: NEGATIVE MG/DL
LEUKOCYTE ESTERASE UR QL STRIP.AUTO: ABNORMAL
LYMPHOCYTES # BLD MANUAL: 0.43 X10*3/UL (ref 0.8–3)
LYMPHOCYTES NFR BLD MANUAL: 11 %
MCH RBC QN AUTO: 30.3 PG (ref 26–34)
MCHC RBC AUTO-ENTMCNC: 32.7 G/DL (ref 32–36)
MCV RBC AUTO: 93 FL (ref 80–100)
METAMYELOCYTES # BLD MANUAL: 0.16 X10*3/UL
METAMYELOCYTES NFR BLD MANUAL: 4 %
MONOCYTES # BLD MANUAL: 0.2 X10*3/UL (ref 0.05–0.8)
MONOCYTES NFR BLD MANUAL: 5 %
MYELOCYTES # BLD MANUAL: 0.16 X10*3/UL
MYELOCYTES NFR BLD MANUAL: 4 %
NEUTROPHILS # BLD MANUAL: 2.85 X10*3/UL (ref 1.6–5.5)
NEUTS BAND # BLD MANUAL: 0.16 X10*3/UL (ref 0–0.5)
NEUTS BAND NFR BLD MANUAL: 4 %
NEUTS SEG # BLD MANUAL: 2.69 X10*3/UL (ref 1.6–5)
NEUTS SEG NFR BLD MANUAL: 69 %
NITRITE UR QL STRIP.AUTO: NEGATIVE
NRBC BLD-RTO: 0 /100 WBCS (ref 0–0)
PH UR STRIP.AUTO: 6.5 [PH]
PLATELET # BLD AUTO: 41 X10*3/UL (ref 150–450)
POLYCHROMASIA BLD QL SMEAR: ABNORMAL
POTASSIUM SERPL-SCNC: 4.5 MMOL/L (ref 3.4–5.1)
PRODUCT BLOOD TYPE: 6200
PRODUCT CODE: NORMAL
PROT SERPL-MCNC: 6.2 G/DL (ref 5.9–7.9)
PROT UR STRIP.AUTO-MCNC: ABNORMAL MG/DL
RBC # BLD AUTO: 2.31 X10*6/UL (ref 4.5–5.9)
RBC # UR STRIP.AUTO: ABNORMAL /UL
RBC #/AREA URNS AUTO: >20 /HPF
RBC MORPH BLD: ABNORMAL
RH FACTOR (ANTIGEN D): NORMAL
SODIUM SERPL-SCNC: 134 MMOL/L (ref 133–145)
SP GR UR STRIP.AUTO: 1.01
TOTAL CELLS COUNTED BLD: 100
UNIT ABO: NORMAL
UNIT NUMBER: NORMAL
UNIT RH: NORMAL
UNIT VOLUME: 350
UROBILINOGEN UR STRIP.AUTO-MCNC: NORMAL MG/DL
WBC # BLD AUTO: 3.9 X10*3/UL (ref 4.4–11.3)
WBC #/AREA URNS AUTO: ABNORMAL /HPF
XM INTEP: NORMAL

## 2024-07-22 PROCEDURE — 86920 COMPATIBILITY TEST SPIN: CPT

## 2024-07-22 PROCEDURE — 36415 COLL VENOUS BLD VENIPUNCTURE: CPT | Performed by: STUDENT IN AN ORGANIZED HEALTH CARE EDUCATION/TRAINING PROGRAM

## 2024-07-22 PROCEDURE — 86901 BLOOD TYPING SEROLOGIC RH(D): CPT | Performed by: STUDENT IN AN ORGANIZED HEALTH CARE EDUCATION/TRAINING PROGRAM

## 2024-07-22 PROCEDURE — 80053 COMPREHEN METABOLIC PANEL: CPT | Performed by: STUDENT IN AN ORGANIZED HEALTH CARE EDUCATION/TRAINING PROGRAM

## 2024-07-22 PROCEDURE — 2500000001 HC RX 250 WO HCPCS SELF ADMINISTERED DRUGS (ALT 637 FOR MEDICARE OP)

## 2024-07-22 PROCEDURE — 36430 TRANSFUSION BLD/BLD COMPNT: CPT

## 2024-07-22 PROCEDURE — 99285 EMERGENCY DEPT VISIT HI MDM: CPT | Mod: 25

## 2024-07-22 PROCEDURE — 87086 URINE CULTURE/COLONY COUNT: CPT | Mod: TRILAB,WESLAB

## 2024-07-22 PROCEDURE — 85027 COMPLETE CBC AUTOMATED: CPT | Performed by: STUDENT IN AN ORGANIZED HEALTH CARE EDUCATION/TRAINING PROGRAM

## 2024-07-22 PROCEDURE — 85007 BL SMEAR W/DIFF WBC COUNT: CPT | Performed by: STUDENT IN AN ORGANIZED HEALTH CARE EDUCATION/TRAINING PROGRAM

## 2024-07-22 PROCEDURE — 81001 URINALYSIS AUTO W/SCOPE: CPT

## 2024-07-22 PROCEDURE — P9016 RBC LEUKOCYTES REDUCED: HCPCS

## 2024-07-22 PROCEDURE — 99283 EMERGENCY DEPT VISIT LOW MDM: CPT | Mod: 25

## 2024-07-22 RX ORDER — OXYCODONE AND ACETAMINOPHEN 5; 325 MG/1; MG/1
1 TABLET ORAL ONCE
Status: COMPLETED | OUTPATIENT
Start: 2024-07-22 | End: 2024-07-22

## 2024-07-22 RX ADMIN — OXYCODONE HYDROCHLORIDE AND ACETAMINOPHEN 1 TABLET: 5; 325 TABLET ORAL at 19:37

## 2024-07-22 ASSESSMENT — PAIN SCALES - GENERAL
PAINLEVEL_OUTOF10: 2
PAINLEVEL_OUTOF10: 0 - NO PAIN
PAINLEVEL_OUTOF10: 3
PAINLEVEL_OUTOF10: 1
PAINLEVEL_OUTOF10: 3
PAINLEVEL_OUTOF10: 0 - NO PAIN

## 2024-07-22 ASSESSMENT — PAIN - FUNCTIONAL ASSESSMENT: PAIN_FUNCTIONAL_ASSESSMENT: 0-10

## 2024-07-22 NOTE — ED PROVIDER NOTES
HPI   Chief Complaint   Patient presents with    Labs Only     Bib ems from [facility] for a blood transfusion, received one last week, does not take blood thinners, has hematuria. NAD and no complaints at this time        Patient is an 84-year-old male presents emergency department for evaluation of low hemoglobin levels.  Patient is coming from long-term care facility where he resides.  He states that he has no complaints today and feels well.  He states that they told him his hemoglobin was low and sent him in for a transfusion.  He states he has had multiple transfusions over the last several months with chronically low hemoglobin levels.  He is unsure as to exactly what is causing his low levels, but does note that he has had some blood in his urine, but states that it has been getting less and less throughout the last few weeks.  He denies any lightheadedness, dizziness, nausea, vomiting, fevers, chills.      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) (Multi) 04/02/2024    Drug-induced constipation 03/26/2024    Fatigue     Gastroesophageal reflux disease 08/28/2023    Gluteal pain 03/25/2024    Heartburn 01/09/2024    History of malignant neoplasm of bone 12/29/2023    HTN (hypertension)     Hyperglycemia     Impaired ambulation 03/30/2024    Insomnia 01/09/2024    Left foot drop     Low back pain 08/27/2014    Nausea 03/26/2024    Open wound of right lower leg 03/12/2024    Personal history of other diseases of the respiratory system 10/08/2017    History of acute bronchitis    Prostate cancer  (Multi)     Prostate cancer metastatic to bone (Multi) 2024    Right hip pain 2024    Seasonal allergies     Spondylolisthesis     Unstable gait     Urinary tract infection 2023    Vertigo     Vitamin D deficiency      Past Surgical History:   Procedure Laterality Date    OTHER SURGICAL HISTORY  2020    Cataract surgery    PROSTATE SURGERY  2015    Prostate Surgery     No family history on file.  Social History     Tobacco Use    Smoking status: Former     Current packs/day: 0.00     Types: Cigarettes     Quit date:      Years since quittin.5     Passive exposure: Past    Smokeless tobacco: Never   Vaping Use    Vaping status: Never Used   Substance Use Topics    Alcohol use: Yes     Alcohol/week: 7.0 standard drinks of alcohol     Types: 7 Glasses of wine per week    Drug use: Never       Physical Exam   ED Triage Vitals [24 1731]   Temperature Heart Rate Respirations BP   36.6 °C (97.9 °F) 86 16 133/72      Pulse Ox Temp Source Heart Rate Source Patient Position   97 % Oral -- --      BP Location FiO2 (%)     -- --       Physical Exam  Constitutional:       Appearance: Normal appearance.   Cardiovascular:      Rate and Rhythm: Normal rate and regular rhythm.   Pulmonary:      Effort: Pulmonary effort is normal.      Breath sounds: Normal breath sounds.   Abdominal:      General: Abdomen is flat.      Palpations: Abdomen is soft.      Tenderness: There is no abdominal tenderness.   Musculoskeletal:         General: Normal range of motion.   Skin:     General: Skin is warm and dry.   Neurological:      General: No focal deficit present.      Mental Status: He is alert and oriented to person, place, and time.           ED Course & MDM   ED Course as of 24 2313      2014 Spoke with patient's PCP Dr. Gresham who is very familiar with patient and does not feel patient requires admission. Recommends transfusion and sending patient back to facility. [AF]       ED Course User Index  [AF] Elle Lacy PA-C         Diagnoses as of 07/22/24 231   Hematuria, unspecified type   Anemia, unspecified type                       [place] Coma Scale Score: 15                        Medical Decision Making  Patient is an 84-year-old male presents emergency department for evaluation of low hemoglobin levels.    EKG was interpreted by attending physician and reviewed by me.    Lab work done today included CMP, CBC, and type and screen.  Lab work with evidence of anemia and leukopenia with hemoglobin at 7.    Scans not warranted at today's visit.    Medications given at today's visit include p.o. Percocet    I saw this patient in conjunction with Dr. Hastings.  Patient remains asymptomatic while the emergency department.  His hemoglobin is at 7 and was given 1 unit of blood for transfusion.  I did speak with patient's primary care provider Dr. Gresham familiar with patient and knows of his anemia which is being worked up outpatient and does not feel patient requires admission at this time.  He recommends 1 unit of blood and send patient back to nursing facility.  Patient agreeable to plan and discharge at this time.  Emergent pathologies were considered for this patient, although I have low suspicion for anything acutely emergent given patient's clinical presentation, history, physical exam, stable vital signs, and relatively unremarkable workup.  Discharging patient home is reasonable plan of care for outpatient management.    All labs, imaging, and diagnostic studies were reviewed by me and patient was counseled on clinical impression, expectations, and plan.  Patient was educated to follow-up with PCP in the following 1-2 days.  All questions from patient were answered. They elicited understanding and were agreeable to course of treatment.  Patient was discharged in stable condition and given strict return precautions.    ** Disclaimer:  Parts of this document were written utilizing a  voice to text dictation software.  Note may contain minor transcription or typographical errors that were inadvertently transcribed by the computer software.         Procedure  Procedures     Elle Lacy PA-C  07/22/24 2851

## 2024-07-23 NOTE — DISCHARGE INSTRUCTIONS
Follow-up closely with primary care provider in the following week.  Follow-up closely with urology for continued management of blood in the urine.

## 2024-07-23 NOTE — ED PROVIDER NOTES
ED Supervising Attending Note & Attestation   I was the Supervising Physician. I have seen face to face and examined the patient; reviewed history and physical, performed a substantive portion of the encounter, and discussed the medical decision making with the Medical Student, Resident, Fellow, PA and/or NP.     I personally saw the patient and made/approve the management plan and take responsibility for the patient management:     84-year-old male from nursing home with past medical history of prostate cancer status post TURBT with hematuria.  Patient notes that hematuria has been persistent for some time now and he has a follow-up on Wednesday with his urologist.  Patient has received some transfusions intermittently throughout the past month and presents today with generalized weakness.  Patient otherwise feels well and denies recent fevers, chills, nausea, vomiting, abdominal pain, shortness of breath, diarrhea or constipation.    ED Triage Vitals   Temperature Heart Rate Respirations BP   07/22/24 1731 07/22/24 1731 07/22/24 1731 07/22/24 1731   36.6 °C (97.9 °F) 86 16 133/72      Pulse Ox Temp Source Heart Rate Source Patient Position   07/22/24 1731 07/22/24 1731 07/22/24 1926 07/22/24 1926   97 % Oral Monitor Lying      BP Location FiO2 (%)     07/22/24 1926 --     Left arm        Vital signs reviewed: Afebrile, nontachycardic, normotensive, 97% on room air    Exam     Constitutional: No acute distress. Resting comfortably.   Head: Normocephalic, atraumatic.   Eyes: Pupils equal bilaterally, EOM grossly intact, conjunctiva normal.  Mouth/Throat: Oropharynx is clear, moist mucus membranes.   Neck: Supple. No lymphadenopathy.  Cardiovascular: Regular rate and regular rhythm. Extremities are well-perfused.   Pulmonary/Chest: No respiratory distress, breathing comfortably on room air.    Abdominal: Soft, non-tender, non-distended. No rebound or guarding.   : Small amount of blood noted from meatus,  uncircumcised.  Musculoskeletal: No lower extremity edema.       Skin: Warm, dry, and intact.   Neurological: Patient is oriented to person, place, time, and situation. Face symmetric, hearing intact to voice, speech normal. Moves all extremities.     Differential includes but is not limited to:  Hematuria versus acute blood loss versus urinary retention    Labs: ordered.  Labs Reviewed   CBC WITH AUTO DIFFERENTIAL - Abnormal       Result Value    WBC 3.9 (*)     nRBC 0.0      RBC 2.31 (*)     Hemoglobin 7.0 (*)     Hematocrit 21.4 (*)     MCV 93      MCH 30.3      MCHC 32.7      RDW 17.2 (*)     Platelets 41 (*)     Immature Granulocytes %, Automated 9.7 (*)     Immature Granulocytes Absolute, Automated 0.38      Narrative:     The previously reported component Neutrophils % is no longer being reported.  The previously reported component Lymphocytes % is no longer being reported.  The previously reported component Monocytes % is no longer being reported.  The previously   reported component Eosinophils % is no longer being reported.  The previously reported component Basophils % is no longer being reported.  The previously reported component Absolute Neutrophils is no longer being reported.  The previously reported   component Absolute Lymphocytes is no longer being reported.  The previously reported component Absolute Monocytes is no longer being reported.  The previously reported component Absolute Eosinophils is no longer being reported.  The previously reported   component Absolute Basophils is no longer being reported.   COMPREHENSIVE METABOLIC PANEL - Abnormal    Glucose 125 (*)     Sodium 134      Potassium 4.5      Chloride 96 (*)     Bicarbonate 24      Urea Nitrogen 28 (*)     Creatinine 1.10      eGFR 65      Calcium 9.2      Albumin 4.1      Alkaline Phosphatase 65      Total Protein 6.2      AST 64 (*)     Bilirubin, Total 0.3      ALT 11      Anion Gap 14     URINALYSIS WITH REFLEX CULTURE AND  MICROSCOPIC - Abnormal    Color, Urine Dark-Brown (*)     Appearance, Urine Ex.Turbid (*)     Specific Gravity, Urine 1.008      pH, Urine 6.5      Protein, Urine 100 (2+) (*)     Glucose, Urine Normal      Blood, Urine OVER (3+) (*)     Ketones, Urine NEGATIVE      Bilirubin, Urine NEGATIVE      Urobilinogen, Urine Normal      Nitrite, Urine NEGATIVE      Leukocyte Esterase, Urine 250 Sheri/µL (*)    MICROSCOPIC ONLY, URINE - Abnormal    WBC, Urine 6-10 (*)     RBC, Urine >20 (*)    MANUAL DIFFERENTIAL - Abnormal    Neutrophils %, Manual 69.0      Bands %, Manual 4.0      Lymphocytes %, Manual 11.0      Monocytes %, Manual 5.0      Eosinophils %, Manual 3.0      Basophils %, Manual 0.0      Metamyelocytes %, Manual 4.0      Myelocytes %, Manual 4.0      Seg Neutrophils Absolute, Manual 2.69      Bands Absolute, Manual 0.16      Lymphocytes Absolute, Manual 0.43 (*)     Monocytes Absolute, Manual 0.20      Eosinophils Absolute, Manual 0.12      Basophils Absolute, Manual 0.00      Metamyelocytes Absolute, Manual 0.16      Myelocytes Absolute, Manual 0.16      Total Cells Counted 100      Neutrophils Absolute, Manual 2.85      RBC Morphology See Below      Polychromasia Mild      Teardrop Cells Few     URINE CULTURE   TYPE AND SCREEN    ABO TYPE A      Rh TYPE POS      ANTIBODY SCREEN NEG     URINALYSIS WITH REFLEX CULTURE AND MICROSCOPIC    Narrative:     The following orders were created for panel order Urinalysis with Reflex Culture and Microscopic.  Procedure                               Abnormality         Status                     ---------                               -----------         ------                     Urinalysis with Reflex C...[[id number]]  Abnormal            Final result               Extra Urine Gray Tube[[id number]]                                                         Please view results for these tests on the individual orders.   EXTRA URINE GRAY TUBE   PREPARE RBC    PRODUCT CODE [id number]       Unit Number [id number]      Unit ABO A      Unit RH POS      XM INTEP COMP      Dispense Status IS      Blood Expiration Date 8/2/2024 11:59:00 PM EDT      PRODUCT BLOOD TYPE 6200      UNIT VOLUME 350         Radiology: Considered but not indicated    ECG/medicine tests: ordered and independent interpretation performed.  ED Course as of 07/22/24 2157 Mon Jul 22, 2024 2014 Spoke with patient's PCP Dr. Gresham who is very familiar with patient and does not feel patient requires admission. Recommends transfusion and sending patient back to facility. [AF]      ED Course User Index  [AF] Elle Lacy PA-C         Diagnoses as of 07/22/24 2157   Hematuria, unspecified type   Anemia, unspecified type     Lab work is interpreted by me shows no significant leukocytosis but does show mild anemia.  CMP otherwise shows no significant electrolyte abnormality, [facility], or LFT abnormality.  Urinalysis shows blood but no evidence of UTI.    Discussion of management or test interpretation with external provider(s):  I personally discussed the patient's management with Dr. Man, primary care provider, who agrees that patient would benefit from blood transfusion at this time and given he has follow-up with urology to follow-up as appropriate.    Plan discussed with patient and he expresses understanding and he will follow-up as appropriate.  Return precautions regarding worsening shortness of breath, bleeding, or inability to urinate to return back to the emergency room.    PLAN AND FOLLOW-UP: Patient counseled on all findings, diagnosis and treatment plan. Patient's questions and concerns addressed. Patient stable, discharged with instructions to follow up with PMD, and to return to ED at any time for worsening symptoms or any other concerns. Patient demonstrates understanding of the findings and the importance of appropriate follow up care.    ED Medications managed:    Medications   oxyCODONE-acetaminophen (Percocet)  5-325 mg per tablet 1 tablet (1 tablet oral Given 7/22/24 1937)     PATIENT REFERRED TO:  Tucker Flores MD  [address] [place] [address]  [place] [id number] [phone]            DISCHARGE MEDICATIONS:  New Prescriptions    No medications on file       Garry Hastings MD  9:57 PM    Attending Emergency Physician  [facility] [place] EMERGENCY MEDICINE             Garry Hastings MD  07/22/24 0954

## 2024-07-24 LAB — BACTERIA UR CULT: ABNORMAL

## 2024-07-25 ENCOUNTER — TELEPHONE (OUTPATIENT)
Dept: PHARMACY | Facility: HOSPITAL | Age: 84
End: 2024-07-25
Payer: MEDICARE

## 2024-07-25 NOTE — PROGRESS NOTES
EDPD Note: Lab/Chart Reviewed    Reviewed Mr./Mrs./Ms. Geovanni Carroll 's chart regarding a positive inconclusive urine culture/result that was taken during their recent emergency room visit. The patient was transferred back to their rehab/LTC facility .Therefore, I have faxed this information to [facility] at fax number [phone] .          No further follow up needed from EDPD Team.     Ermelinda Mojica, CaroleD

## 2024-11-12 ENCOUNTER — APPOINTMENT (OUTPATIENT)
Dept: PRIMARY CARE | Facility: CLINIC | Age: 84
End: 2024-11-12
Payer: MEDICARE